# Patient Record
Sex: FEMALE | Race: WHITE | Employment: PART TIME | URBAN - NONMETROPOLITAN AREA
[De-identification: names, ages, dates, MRNs, and addresses within clinical notes are randomized per-mention and may not be internally consistent; named-entity substitution may affect disease eponyms.]

---

## 2020-03-12 ENCOUNTER — OFFICE VISIT (OUTPATIENT)
Dept: PRIMARY CARE CLINIC | Age: 35
End: 2020-03-12
Payer: OTHER GOVERNMENT

## 2020-03-12 VITALS
BODY MASS INDEX: 27 KG/M2 | OXYGEN SATURATION: 98 % | WEIGHT: 172 LBS | SYSTOLIC BLOOD PRESSURE: 122 MMHG | HEIGHT: 67 IN | TEMPERATURE: 98.6 F | HEART RATE: 78 BPM | RESPIRATION RATE: 16 BRPM | DIASTOLIC BLOOD PRESSURE: 78 MMHG

## 2020-03-12 LAB
ANION GAP SERPL CALCULATED.3IONS-SCNC: 13 MMOL/L (ref 7–19)
BUN BLDV-MCNC: 10 MG/DL (ref 6–20)
CALCIUM SERPL-MCNC: 9.2 MG/DL (ref 8.6–10)
CHLORIDE BLD-SCNC: 105 MMOL/L (ref 98–111)
CO2: 23 MMOL/L (ref 22–29)
CREAT SERPL-MCNC: 0.9 MG/DL (ref 0.5–0.9)
GFR NON-AFRICAN AMERICAN: >60
GLUCOSE BLD-MCNC: 96 MG/DL (ref 74–109)
POTASSIUM SERPL-SCNC: 4 MMOL/L (ref 3.5–5)
SEDIMENTATION RATE, ERYTHROCYTE: 7 MM/HR (ref 0–20)
SODIUM BLD-SCNC: 141 MMOL/L (ref 136–145)
TSH SERPL DL<=0.05 MIU/L-ACNC: 2.4 UIU/ML (ref 0.27–4.2)

## 2020-03-12 PROCEDURE — 36415 COLL VENOUS BLD VENIPUNCTURE: CPT | Performed by: NURSE PRACTITIONER

## 2020-03-12 PROCEDURE — 99203 OFFICE O/P NEW LOW 30 MIN: CPT | Performed by: NURSE PRACTITIONER

## 2020-03-12 RX ORDER — RIZATRIPTAN BENZOATE 10 MG/1
10 TABLET ORAL
COMMUNITY
End: 2021-12-08

## 2020-03-12 RX ORDER — SERTRALINE HYDROCHLORIDE 100 MG/1
100 TABLET, FILM COATED ORAL DAILY
COMMUNITY
End: 2020-03-25 | Stop reason: SDUPTHER

## 2020-03-12 RX ORDER — DROSPIRENONE AND ETHINYL ESTRADIOL 0.02-3(28)
1 KIT ORAL DAILY
COMMUNITY
End: 2020-10-14 | Stop reason: SDUPTHER

## 2020-03-12 RX ORDER — BUPROPION HYDROCHLORIDE 150 MG/1
150 TABLET ORAL EVERY MORNING
COMMUNITY
End: 2020-04-21 | Stop reason: SDUPTHER

## 2020-03-12 RX ORDER — CELECOXIB 100 MG/1
100 CAPSULE ORAL 2 TIMES DAILY
Qty: 60 CAPSULE | Refills: 5 | Status: SHIPPED | OUTPATIENT
Start: 2020-03-12 | End: 2020-10-29

## 2020-03-12 SDOH — HEALTH STABILITY: MENTAL HEALTH: HOW OFTEN DO YOU HAVE A DRINK CONTAINING ALCOHOL?: NEVER

## 2020-03-12 NOTE — PROGRESS NOTES
MG tablet Take 100 mg by mouth daily Tale 1.5 tablets DAILY      buPROPion (WELLBUTRIN XL) 150 MG extended release tablet Take 150 mg by mouth every morning      rizatriptan (MAXALT) 10 MG tablet Take 10 mg by mouth once as needed for Migraine May repeat in 2 hours if needed      drospirenone-ethinyl estradiol (DONTRELL) 3-0.02 MG per tablet Take 1 tablet by mouth daily      celecoxib (CELEBREX) 100 MG capsule Take 1 capsule by mouth 2 times daily 60 capsule 5     No current facility-administered medications for this visit. Allergies   Allergen Reactions    Minocycline Swelling       Health Maintenance   Topic Date Due    Varicella vaccine (1 of 2 - 2-dose childhood series) 08/10/1986    HIV screen  08/10/2000    DTaP/Tdap/Td vaccine (1 - Tdap) 08/10/2004    Cervical cancer screen  08/10/2006    Flu vaccine (1) 03/12/2021 (Originally 9/1/2019)    Shingles Vaccine (1 of 2) 08/10/2035    Hepatitis A vaccine  Aged Out    Hepatitis B vaccine  Aged Out    Hib vaccine  Aged Out    Meningococcal (ACWY) vaccine  Aged Out    Pneumococcal 0-64 years Vaccine  Aged Out       Subjective:      Review of Systems   Constitutional: Positive for fatigue and unexpected weight change. Respiratory: Negative. Cardiovascular: Negative. Gastrointestinal: Negative. Genitourinary: Negative. Musculoskeletal: Positive for myalgias. Psychiatric/Behavioral: Negative. Objective:     Physical Exam  Vitals signs and nursing note reviewed. Constitutional:       Appearance: She is well-developed. HENT:      Head: Normocephalic. Right Ear: External ear normal.      Left Ear: External ear normal.   Eyes:      Pupils: Pupils are equal, round, and reactive to light. Neck:      Musculoskeletal: Normal range of motion. Cardiovascular:      Rate and Rhythm: Normal rate and regular rhythm. Heart sounds: Normal heart sounds.    Pulmonary:      Effort: Pulmonary effort is normal.      Breath sounds: Normal breath sounds. Skin:     General: Skin is warm and dry. Neurological:      Mental Status: She is alert and oriented to person, place, and time. Psychiatric:         Behavior: Behavior normal.         Thought Content: Thought content normal.         Judgment: Judgment normal.       /78 (Site: Left Upper Arm, Position: Sitting, Cuff Size: Large Adult)   Pulse 78   Temp 98.6 °F (37 °C) (Temporal)   Resp 16   Ht 5' 7\" (1.702 m)   Wt 172 lb (78 kg)   SpO2 98%   Breastfeeding No   BMI 26.94 kg/m²     Assessment:       Diagnosis Orders   1. Arthralgia of both hands  Cyclic Citrul Peptide Antibody, IgG    QUAN Screen with Reflex    Basic Metabolic Panel    Sedimentation Rate    C-reactive protein    TSH without Reflex   2. Chronic bilateral low back pain without sciatica  Cyclic Citrul Peptide Antibody, IgG    QUAN Screen with Reflex    Basic Metabolic Panel    Sedimentation Rate    C-reactive protein    TSH without Reflex   3. Fatigue, unspecified type  Cyclic Citrul Peptide Antibody, IgG    QUAN Screen with Reflex    Basic Metabolic Panel    Sedimentation Rate    C-reactive protein    TSH without Reflex   4. Weight gain  Cyclic Citrul Peptide Antibody, IgG    QUAN Screen with Reflex    Basic Metabolic Panel    Sedimentation Rate    C-reactive protein    TSH without Reflex         Plan:   More than 50% of the time was spent counseling and coordinating care for a total time of 30min face to face. Patient given educational materials -see patient instructions. Discussed use, benefit, and side effects of prescribed medications. All patient questions answered. Pt voiced understanding. Reviewed health maintenance. Instructed to continue currentmedications, diet and exercise. Patient agreed with treatment plan. Follow up as directed.    MEDICATIONS:  Orders Placed This Encounter   Medications    celecoxib (CELEBREX) 100 MG capsule     Sig: Take 1 capsule by mouth 2 times daily     Dispense:  60 capsule     Refill:  5         ORDERS:  Orders Placed This Encounter   Procedures    Cyclic Citrul Peptide Antibody, IgG    QUAN Screen with Reflex    Basic Metabolic Panel    Sedimentation Rate    C-reactive protein    TSH without Reflex       Follow-up:  Return in about 4 weeks (around 4/9/2020) for f/u. PATIENT INSTRUCTIONS:  Patient Instructions     celebrex is twice a day for arthritis pain  Start stretching or youa  Labs today , if abnormal will do Rheumatology if normal do Dr Stanton Bhakta for fibromyalgia. Continue antidepressants  Continue birth control  Mammogram 35-40  Pap smear when 3 yrs. Next headache instead of maxalt try the ubrelvy one tablet may repeat one in 24 hours. May discuss workup later on headaches  Always have yearly eye exam.  Look at gluten free diet for inflammation. Patient Education        Fibromyalgia: Care Instructions  Your Care Instructions    Fibromyalgia is a painful condition that is not completely understood by medical experts. The cause of fibromyalgia is not known. It can make you feel tired and ache all over. It causes tender spots at specific points of the body that hurt only when you press on them. You may have trouble sleeping, as well as other symptoms. These problems can upset your work and home life. Symptoms tend to come and go, although they may never go away completely. Fibromyalgia does not harm your muscles, joints, or organs. Follow-up care is a key part of your treatment and safety. Be sure to make and go to all appointments, and call your doctor if you are having problems. It's also a good idea to know your test results and keep a list of the medicines you take. How can you care for yourself at home? · Exercise often. Walk, swim, or bike to help with pain and sleep problems and to make you feel better. · Try to get a good night's sleep. Go to bed and get up at the same time each day, whether you feel rested or not.  Make sure you have a good information. Electronically signed by MARTÍNEZ Valdez CNP on 3/16/2020 at 3:45 PM    EMR Dragon/transcription disclaimer:  Much of thisencounter note is electronic transcription/translation of spoken language to printed texts. The electronic translation of spoken language may be erroneous, or at times, nonsensical words or phrases may be inadvertentlytranscribed.   Although I have reviewed the note for such errors, some may still exist.

## 2020-03-12 NOTE — PATIENT INSTRUCTIONS
celebrex is twice a day for arthritis pain  Start stretching or youa  Labs today , if abnormal will do Rheumatology if normal do Dr Zach Bullock for fibromyalgia. Continue antidepressants  Continue birth control  Mammogram 35-40  Pap smear when 3 yrs. Next headache instead of maxalt try the ubrelvy one tablet may repeat one in 24 hours. May discuss workup later on headaches  Always have yearly eye exam.  Look at gluten free diet for inflammation. Patient Education        Fibromyalgia: Care Instructions  Your Care Instructions    Fibromyalgia is a painful condition that is not completely understood by medical experts. The cause of fibromyalgia is not known. It can make you feel tired and ache all over. It causes tender spots at specific points of the body that hurt only when you press on them. You may have trouble sleeping, as well as other symptoms. These problems can upset your work and home life. Symptoms tend to come and go, although they may never go away completely. Fibromyalgia does not harm your muscles, joints, or organs. Follow-up care is a key part of your treatment and safety. Be sure to make and go to all appointments, and call your doctor if you are having problems. It's also a good idea to know your test results and keep a list of the medicines you take. How can you care for yourself at home? · Exercise often. Walk, swim, or bike to help with pain and sleep problems and to make you feel better. · Try to get a good night's sleep. Go to bed and get up at the same time each day, whether you feel rested or not. Make sure you have a good mattress and pillow. · Reduce stress. Avoid things that cause you stress, if you can. If not, work at making them less stressful. Learn to use biofeedback, guided imagery, meditation, or other methods to relax. · Make healthy changes. Eat a balanced diet, quit smoking, and limit alcohol and caffeine.   · Use a heating pad set on low or take warm baths or showers for pain. Using cold packs for up to 20 minutes at a time can also relieve pain. Put a thin cloth between the cold pack and your skin. A gentle massage might help too. · Be safe with medicines. Take your medicines exactly as prescribed. Call your doctor if you think you are having a problem with your medicine. Your doctor may talk to you about taking antidepressant medicines. These medicines may improve sleep, relieve pain, and in some cases treat depression. · Learn about fibromyalgia. This makes coping easier. Then, take an active role in your treatment. · Think about joining a support group with others who have fibromyalgia to learn more and get support. When should you call for help? Watch closely for changes in your health, and be sure to contact your doctor if:    · You feel sad, helpless, or hopeless; lose interest in things you used to enjoy; or have other symptoms of depression.     · Your fibromyalgia symptoms get worse. Where can you learn more? Go to https://Catarizm.[employer]Invention. org and sign in to your [employer] account. Enter V003 in the [employer] box to learn more about \"Fibromyalgia: Care Instructions. \"     If you do not have an account, please click on the \"Sign Up Now\" link. Current as of: March 28, 2019  Content Version: 12.3  © 5267-8433 Healthwise, Incorporated. Care instructions adapted under license by [facility] ([facility]). If you have questions about a medical condition or this instruction, always ask your healthcare professional. [place] [address] any warranty or liability for your use of this information.

## 2020-03-15 LAB
ANA IGG, ELISA: NORMAL
CCP IGG ANTIBODIES: 186 UNITS (ref 0–19)

## 2020-03-16 LAB — C-REACTIVE PROTEIN: 0.26 MG/DL (ref 0–0.5)

## 2020-03-16 ASSESSMENT — ENCOUNTER SYMPTOMS
GASTROINTESTINAL NEGATIVE: 1
RESPIRATORY NEGATIVE: 1

## 2020-03-17 ENCOUNTER — NURSE ONLY (OUTPATIENT)
Dept: PRIMARY CARE CLINIC | Age: 35
End: 2020-03-17

## 2020-03-17 LAB — RHEUMATOID FACTOR: <10 IU/ML

## 2020-03-25 RX ORDER — SERTRALINE HYDROCHLORIDE 100 MG/1
100 TABLET, FILM COATED ORAL DAILY
Qty: 30 TABLET | Refills: 3 | Status: SHIPPED | OUTPATIENT
Start: 2020-03-25 | End: 2020-10-29 | Stop reason: SDUPTHER

## 2020-04-02 ENCOUNTER — TELEMEDICINE (OUTPATIENT)
Dept: PRIMARY CARE CLINIC | Age: 35
End: 2020-04-02
Payer: OTHER GOVERNMENT

## 2020-04-02 ENCOUNTER — TELEPHONE (OUTPATIENT)
Dept: PRIMARY CARE CLINIC | Age: 35
End: 2020-04-02

## 2020-04-02 PROCEDURE — 99214 OFFICE O/P EST MOD 30 MIN: CPT | Performed by: NURSE PRACTITIONER

## 2020-04-02 RX ORDER — TRAMADOL HYDROCHLORIDE 50 MG/1
50 TABLET ORAL 2 TIMES DAILY PRN
Qty: 30 TABLET | Refills: 0 | Status: SHIPPED | OUTPATIENT
Start: 2020-04-02 | End: 2020-04-16

## 2020-04-02 RX ORDER — UBROGEPANT 50 MG/1
TABLET ORAL
Qty: 9 TABLET | Refills: 1 | Status: SHIPPED | OUTPATIENT
Start: 2020-04-02 | End: 2020-10-29 | Stop reason: SDUPTHER

## 2020-04-02 ASSESSMENT — ENCOUNTER SYMPTOMS
BACK PAIN: 0
EYES NEGATIVE: 1
RESPIRATORY NEGATIVE: 1
GASTROINTESTINAL NEGATIVE: 1

## 2020-04-02 NOTE — LETTER
[facility] [address]  [address] [place] [address]  Phone: [phone]  Fax: [phone]    MARTÍNEZ Roberts CNP        April 2, 2020  RE:  [address] [address]      To Whom It May Concern,        Petey Bradford is under my medical care. I began seeing her in March 2020. She underwent testing for some pain she was having. She has been diagnosed with rheumatoid arthritis by lab work. Currently she is scheduled to see a rheumatologist at the [facility]. She will need to undergo several test over the next few months. We have placed her on light duty due to her increasing pain and have started her on multiple new medications. It is my understanding that currently her  is stationed in [facility] and she is a single parent. It would be of benefit to her if her  were allowed to come home to help her in this transitioning time. If you have any questions or concerns, please don't hesitate to call.     Sincerely,        MARTÍNEZ Roberts CNP

## 2020-04-02 NOTE — TELEPHONE ENCOUNTER
Called Dr. Aysha Higginbotham office in [facility] to see if they took  and to see when they could get patient in for a new patient appointment.  The office was closed and will not open back up until Monday after 8:30 AM.

## 2020-04-02 NOTE — PROGRESS NOTES
Allergies   Allergen Reactions    Minocycline Swelling   ,   Past Medical History:   Diagnosis Date    Anxiety     Depression     Headache    , No past surgical history on file.,   Social History     Tobacco Use    Smoking status: Never Smoker    Smokeless tobacco: Never Used   Substance Use Topics    Alcohol use: Never     Frequency: Never    Drug use: Never   ,   Family History   Problem Relation Age of Onset    Arthritis Mother     Heart Disease Father     Diabetes Father     High Blood Pressure Father     High Cholesterol Father     Cancer Maternal Grandmother         uterine   ,   There is no immunization history on file for this patient.,   Health Maintenance   Topic Date Due    Varicella vaccine (1 of 2 - 2-dose childhood series) 08/10/1986    HIV screen  08/10/2000    DTaP/Tdap/Td vaccine (1 - Tdap) 08/10/2004    Cervical cancer screen  08/10/2006    Flu vaccine (Season Ended) 03/12/2021 (Originally 9/1/2020)    Hepatitis A vaccine  Aged Out    Hepatitis B vaccine  Aged Out    Hib vaccine  Aged Out    Meningococcal (ACWY) vaccine  Aged Out    Pneumococcal 0-64 years Vaccine  Aged Out       PHYSICAL EXAMINATION:  Physical Exam  Vitals signs and nursing note reviewed. Constitutional:       Appearance: Normal appearance. She is well-developed. HENT:      Head: Normocephalic. Eyes:      Pupils: Pupils are equal, round, and reactive to light. Neck:      Musculoskeletal: Normal range of motion. Cardiovascular:      Rate and Rhythm: Normal rate. Pulmonary:      Effort: Pulmonary effort is normal.   Skin:     General: Skin is warm and dry. Neurological:      General: No focal deficit present. Mental Status: She is alert and oriented to person, place, and time. Psychiatric:         Mood and Affect: Mood normal.         Behavior: Behavior normal.         Thought Content:  Thought content normal.         Judgment: Judgment normal.         Due to this being a TeleHealth Madina Hwang CNP on 4/2/2020 at 12:02 PM        Pursuant to the emergency declaration under the [address] [place] [address], [facility] waiver authority and the [employer] Act, this Virtual  Visit was conducted, with patient's consent, to reduce the patient's risk of exposure to COVID-19 and provide continuity of care for an established patient. Services were provided through a video synchronous discussion virtually to substitute for in-person clinic visit.

## 2020-04-13 ENCOUNTER — TELEPHONE (OUTPATIENT)
Dept: ADMINISTRATIVE | Age: 35
End: 2020-04-13

## 2020-04-14 ENCOUNTER — PATIENT MESSAGE (OUTPATIENT)
Dept: PRIMARY CARE CLINIC | Age: 35
End: 2020-04-14

## 2020-04-14 NOTE — TELEPHONE ENCOUNTER
From: Arianna Grief  To: JamarMARTÍNEZ Quinteros - CNP  Sent: 4/14/2020 12:21 PM CDT  Subject: Non-Urgent Medical Question    If at all possible I need a few more things added to the letter you wrote for my  to come home from [facility]. They are asking that the following be added. A specific medical diagnosis and prognosis. The date of onset, past and anticipated periods of hospitalization. 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 I know not all of these apply to me, but any that do would be greatly appreciated if it could be put in a letter, or added to the already existing letter. Thank you so much for your time.

## 2020-04-21 ENCOUNTER — PATIENT MESSAGE (OUTPATIENT)
Dept: PRIMARY CARE CLINIC | Age: 35
End: 2020-04-21

## 2020-04-21 RX ORDER — BUPROPION HYDROCHLORIDE 150 MG/1
150 TABLET ORAL EVERY MORNING
Qty: 90 TABLET | Refills: 2 | Status: SHIPPED | OUTPATIENT
Start: 2020-04-21 | End: 2020-11-25 | Stop reason: SDUPTHER

## 2020-05-22 ENCOUNTER — VIRTUAL VISIT (OUTPATIENT)
Dept: PRIMARY CARE CLINIC | Age: 35
End: 2020-05-22
Payer: OTHER GOVERNMENT

## 2020-05-22 PROCEDURE — 99441 PR PHYS/QHP TELEPHONE EVALUATION 5-10 MIN: CPT | Performed by: NURSE PRACTITIONER

## 2020-05-22 RX ORDER — PREDNISONE 10 MG/1
TABLET ORAL
Qty: 18 TABLET | Refills: 0 | Status: SHIPPED | OUTPATIENT
Start: 2020-05-22 | End: 2022-02-28

## 2020-05-22 RX ORDER — CYCLOBENZAPRINE HCL 10 MG
10 TABLET ORAL 3 TIMES DAILY PRN
Qty: 30 TABLET | Refills: 0 | Status: SHIPPED | OUTPATIENT
Start: 2020-05-22 | End: 2020-06-01

## 2020-10-14 RX ORDER — DROSPIRENONE AND ETHINYL ESTRADIOL 0.02-3(28)
1 KIT ORAL DAILY
Qty: 28 TABLET | Refills: 1 | Status: SHIPPED | OUTPATIENT
Start: 2020-10-14 | End: 2020-10-29 | Stop reason: SDUPTHER

## 2020-10-29 ENCOUNTER — OFFICE VISIT (OUTPATIENT)
Dept: PRIMARY CARE CLINIC | Age: 35
End: 2020-10-29
Payer: OTHER GOVERNMENT

## 2020-10-29 VITALS
OXYGEN SATURATION: 99 % | RESPIRATION RATE: 16 BRPM | SYSTOLIC BLOOD PRESSURE: 100 MMHG | BODY MASS INDEX: 26.53 KG/M2 | HEIGHT: 67 IN | DIASTOLIC BLOOD PRESSURE: 70 MMHG | TEMPERATURE: 97.9 F | WEIGHT: 169 LBS | HEART RATE: 85 BPM

## 2020-10-29 PROCEDURE — 99395 PREV VISIT EST AGE 18-39: CPT | Performed by: NURSE PRACTITIONER

## 2020-10-29 RX ORDER — FOLIC ACID 1 MG/1
TABLET ORAL
COMMUNITY
Start: 2020-10-17

## 2020-10-29 RX ORDER — DROSPIRENONE AND ETHINYL ESTRADIOL 0.02-3(28)
1 KIT ORAL DAILY
Qty: 84 TABLET | Refills: 3 | Status: SHIPPED | OUTPATIENT
Start: 2020-10-29 | End: 2021-12-08 | Stop reason: SDUPTHER

## 2020-10-29 RX ORDER — METHOTREXATE 2.5 MG/1
TABLET ORAL
COMMUNITY
Start: 2020-09-10

## 2020-10-29 RX ORDER — ADALIMUMAB 40MG/0.4ML
KIT SUBCUTANEOUS
COMMUNITY
Start: 2020-10-08 | End: 2021-12-08 | Stop reason: SINTOL

## 2020-10-29 RX ORDER — CYCLOBENZAPRINE HCL 10 MG
10 TABLET ORAL DAILY PRN
COMMUNITY

## 2020-10-29 RX ORDER — UBROGEPANT 50 MG/1
TABLET ORAL
Qty: 9 TABLET | Refills: 1 | Status: SHIPPED | OUTPATIENT
Start: 2020-10-29 | End: 2021-12-08 | Stop reason: SDUPTHER

## 2020-10-29 RX ORDER — SERTRALINE HYDROCHLORIDE 100 MG/1
100 TABLET, FILM COATED ORAL DAILY
Qty: 145 TABLET | Refills: 3 | Status: SHIPPED | OUTPATIENT
Start: 2020-10-29 | End: 2021-06-25 | Stop reason: SDUPTHER

## 2020-10-29 ASSESSMENT — PATIENT HEALTH QUESTIONNAIRE - PHQ9
1. LITTLE INTEREST OR PLEASURE IN DOING THINGS: 1
SUM OF ALL RESPONSES TO PHQ QUESTIONS 1-9: 1
SUM OF ALL RESPONSES TO PHQ9 QUESTIONS 1 & 2: 1
SUM OF ALL RESPONSES TO PHQ QUESTIONS 1-9: 1
SUM OF ALL RESPONSES TO PHQ QUESTIONS 1-9: 1
2. FEELING DOWN, DEPRESSED OR HOPELESS: 0

## 2020-10-29 ASSESSMENT — ENCOUNTER SYMPTOMS
DIARRHEA: 1
BACK PAIN: 1

## 2020-10-29 NOTE — PATIENT INSTRUCTIONS
Continue with Dr. Terry Rosen continue taking the birth control continuously  If you would like a referral for an endometrial ablation and a tubal ligation I would be glad to do that for you  Baseline mammogram  Patient Education        Endometrial Ablation: Before Your Procedure  What is endometrial ablation? Endometrial ablation is a type of procedure that's often used to treat heavy menstrual bleeding. It can also be used for other types of bleeding in the uterus. It's not recommended if you plan to get pregnant. Ablation works by destroying the lining of your uterus. As it heals, the lining will scar. This scarring reduces or prevents bleeding. Your doctor may give you medicine to help you relax. You may also get medicine to help with pain. First, your doctor inserts a special tool into your vagina. This is called a speculum. It gently spreads apart the sides of your vagina. Next, the doctor may put a lighted tube through your cervix. This is called a hysteroscope or scope. It helps the doctor see inside your uterus. Then the doctor inserts a device to destroy the lining. This device may work in one of many ways. It may use a laser beam, heat, electricity, freezing, or microwaves. Ablation can be done in a doctor's office. Or it may be done in a hospital. It usually takes less than an hour. You can go home after the procedure. Follow-up care is a key part of your treatment and safety. Be sure to make and go to all appointments, and call your doctor if you are having problems. It's also a good idea to know your test results and keep a list of the medicines you take. How do you prepare for the procedure? Procedures can be stressful. This information will help you understand what you can expect. And it will help you safely prepare for your procedure. Preparing for the procedure    · Be sure you have someone to take you home.  Anesthesia and pain medicine will make it unsafe for you to drive or get home on your own.     · Understand exactly what procedure is planned, along with the risks, benefits, and other options.     · If you take aspirin or some other blood thinner, ask your doctor if you should stop taking it before your procedure. Make sure that you understand exactly what your doctor wants you to do. These medicines increase the risk of bleeding.     · Tell your doctor ALL the medicines, vitamins, supplements, and herbal remedies you take. Some may increase the risk of problems during your procedure. Your doctor will tell you if you should stop taking any of them before the procedure and how soon to do it.     · Make sure your doctor and the hospital have a copy of your advance directive. If you don't have one, you may want to prepare one. It lets others know your health care wishes. It's a good thing to have before any type of surgery or procedure. What happens on the day of the procedure? · Follow the instructions exactly about when to stop eating and drinking. If you don't, your procedure may be canceled. If your doctor told you to take your medicines on the day of the procedure, take them with only a sip of water.     · Take a bath or shower before you come in for your procedure. Do not apply lotions, perfumes, deodorants, or nail polish.     · Take off all jewelry and piercings. And take out contact lenses, if you wear them. At the doctor's office or hospital   · Bring a picture ID.     · You will be kept comfortable and safe by your anesthesia provider. You may get medicine that relaxes you or puts you in a light sleep.     · The procedure will take less than an hour. When should you call your doctor? · You have questions or concerns.     · You do not understand how to prepare for your procedure.     · You become ill before the procedure (such as fever, flu, or a cold).     · You need to reschedule or have changed your mind about having the procedure. Where can you learn more?   Go to https://chpepiceweb.[email or web address] org and sign in to your [employer] account. Enter Y830 in the rimidihire box to learn more about \"Endometrial Ablation: Before Your Procedure. \"     If you do not have an account, please click on the \"Sign Up Now\" link. Current as of: November 8, 2019               Content Version: 12.6  © 6713-6677 [employer], [employer]. Care instructions adapted under license by [facility] ([facility]). If you have questions about a medical condition or this instruction, always ask your healthcare professional. [address] any warranty or liability for your use of this information.

## 2020-10-29 NOTE — PROGRESS NOTES
Tobacco Use    Smoking status: Never Smoker    Smokeless tobacco: Never Used   Substance Use Topics    Alcohol use: Never     Frequency: Never      Current Outpatient Medications   Medication Sig Dispense Refill    HUMIRA PEN 40 MG/0.4ML PNKT       cyclobenzaprine (FLEXERIL) 10 MG tablet Take 10 mg by mouth daily as needed      folic acid (FOLVITE) 1 MG tablet       methotrexate (RHEUMATREX) 2.5 MG chemo tablet       drospirenone-ethinyl estradiol (DONTRELL) 3-0.02 MG per tablet Take 1 tablet by mouth daily Take the pill continue this skipping the placebo pills 84 tablet 3    sertraline (ZOLOFT) 100 MG tablet Take 1 tablet by mouth daily Tale 1.5 tablets DAILY 145 tablet 3    Ubrogepant (UBRELVY) 50 MG TABS 1 p.o. at the onset of a headache and may repeat one in a 24-hour. 9 tablet 1    predniSONE (DELTASONE) 10 MG tablet Days 1-3 take 1 PO TID, days 4-6 take 1 PO BID, days 7-9 take 1 PO daily. 18 tablet 0    buPROPion (WELLBUTRIN XL) 150 MG extended release tablet Take 1 tablet by mouth every morning 90 tablet 2    rizatriptan (MAXALT) 10 MG tablet Take 10 mg by mouth once as needed for Migraine May repeat in 2 hours if needed       No current facility-administered medications for this visit. Allergies   Allergen Reactions    Minocycline Swelling       Health Maintenance   Topic Date Due    Varicella vaccine (1 of 2 - 2-dose childhood series) 08/10/1986    HIV screen  08/10/2000    DTaP/Tdap/Td vaccine (1 - Tdap) 08/10/2004    Cervical cancer screen  08/10/2006    Flu vaccine (1) 09/01/2020    Hepatitis A vaccine  Aged Out    Hepatitis B vaccine  Aged Out    Hib vaccine  Aged Out    Meningococcal (ACWY) vaccine  Aged Out    Pneumococcal 0-64 years Vaccine  Aged Out       Subjective:      Review of Systems   Constitutional: Negative. HENT: Negative. Gastrointestinal: Positive for diarrhea (with meds). Genitourinary: Positive for menstrual problem.    Musculoskeletal: Positive for arthralgias and back pain. Psychiatric/Behavioral: Positive for dysphoric mood and sleep disturbance. Negative for self-injury and suicidal ideas. The patient is nervous/anxious. Objective:     Physical Exam  Vitals signs and nursing note reviewed. Exam conducted with a chaperone present. Constitutional:       Appearance: She is well-developed. HENT:      Head: Normocephalic. Right Ear: Tympanic membrane and external ear normal.      Left Ear: Tympanic membrane and external ear normal.      Nose: Nose normal.   Eyes:      Pupils: Pupils are equal, round, and reactive to light. Neck:      Musculoskeletal: Normal range of motion. Cardiovascular:      Rate and Rhythm: Normal rate and regular rhythm. Heart sounds: Normal heart sounds. Pulmonary:      Effort: Pulmonary effort is normal.      Breath sounds: Normal breath sounds. Chest:      Breasts:         Right: Normal.         Left: Normal.   Abdominal:      General: Abdomen is flat. Bowel sounds are normal.   Genitourinary:     General: Normal vulva. Exam position: Supine. Labia:         Right: No rash, tenderness, lesion or injury. Left: No rash, tenderness, lesion or injury. Vagina: Normal.      Cervix: Friability present. Uterus: Normal.       Adnexa: Right adnexa normal and left adnexa normal.      Rectum: Normal.          Comments: Pap smear obtained from the cervix  Musculoskeletal: Normal range of motion. Skin:     General: Skin is warm and dry. Capillary Refill: Capillary refill takes less than 2 seconds. Neurological:      General: No focal deficit present. Mental Status: She is alert and oriented to person, place, and time. Psychiatric:         Mood and Affect: Mood normal.         Behavior: Behavior normal.         Thought Content:  Thought content normal.         Judgment: Judgment normal.       /70   Pulse 85   Temp 97.9 °F (36.6 °C) (Temporal)   Resp 16   Ht 5' 7\" (1.702 m)   Wt 169 lb (76.7 kg)   SpO2 99%   Breastfeeding No   BMI 26.47 kg/m²     Assessment:       Diagnosis Orders   1. Well woman exam with routine gynecological exam     2. Screening for malignant neoplasm of cervix  PAP SMEAR   3. Rheumatoid arthritis with negative rheumatoid factor, involving unspecified site ([facility] [address]     4. Encounter for screening mammogram for malignant neoplasm of breast  Mammography screening bilateral   5. Depression with anxiety           Plan:       Patient given educational materials -see patient instructions. Discussed use, benefit, and side effects of prescribed medications. All patient questions answered. Pt voiced understanding. I discussed with the patient with the simpler for her  to have a vasectomy but she could get her tubes tied when she was having an endometrial ablation if she wants. recommemded Dr Yonis Murrell or Dr Ros Hairston if she wants tubes tied   Reviewed health maintenance. Instructed to continue currentmedications, diet and exercise. Patient agreed with treatment plan. Follow up as directed. MEDICATIONS:  Orders Placed This Encounter   Medications    drospirenone-ethinyl estradiol (DONTRELL) 3-0.02 MG per tablet     Sig: Take 1 tablet by mouth daily Take the pill continue this skipping the placebo pills     Dispense:  84 tablet     Refill:  3    sertraline (ZOLOFT) 100 MG tablet     Sig: Take 1 tablet by mouth daily Tale 1.5 tablets DAILY     Dispense:  145 tablet     Refill:  3    Ubrogepant (UBRELVY) 50 MG TABS     Si p.o. at the onset of a headache and may repeat one in a 24-hour. Dispense:  9 tablet     Refill:  1         ORDERS:  Orders Placed This Encounter   Procedures    Mammography screening bilateral    PAP SMEAR       Follow-up:  No follow-ups on file.     PATIENT INSTRUCTIONS:  Patient Instructions     Continue with Dr. Candy Matute continue taking the birth control continuously  If you would like a referral for an endometrial ablation and a tubal ligation I would be glad to do that for you  Baseline mammogram  Patient Education        Endometrial Ablation: Before Your Procedure  What is endometrial ablation? Endometrial ablation is a type of procedure that's often used to treat heavy menstrual bleeding. It can also be used for other types of bleeding in the uterus. It's not recommended if you plan to get pregnant. Ablation works by destroying the lining of your uterus. As it heals, the lining will scar. This scarring reduces or prevents bleeding. Your doctor may give you medicine to help you relax. You may also get medicine to help with pain. First, your doctor inserts a special tool into your vagina. This is called a speculum. It gently spreads apart the sides of your vagina. Next, the doctor may put a lighted tube through your cervix. This is called a hysteroscope or scope. It helps the doctor see inside your uterus. Then the doctor inserts a device to destroy the lining. This device may work in one of many ways. It may use a laser beam, heat, electricity, freezing, or microwaves. Ablation can be done in a doctor's office. Or it may be done in a hospital. It usually takes less than an hour. You can go home after the procedure. Follow-up care is a key part of your treatment and safety. Be sure to make and go to all appointments, and call your doctor if you are having problems. It's also a good idea to know your test results and keep a list of the medicines you take. How do you prepare for the procedure? Procedures can be stressful. This information will help you understand what you can expect. And it will help you safely prepare for your procedure. Preparing for the procedure    · Be sure you have someone to take you home.  Anesthesia and pain medicine will make it unsafe for you to drive or get home on your own.     · Understand exactly what procedure is planned, along with the risks, benefits, and other options.     · If you take aspirin or some other blood thinner, ask your doctor if you should stop taking it before your procedure. Make sure that you understand exactly what your doctor wants you to do. These medicines increase the risk of bleeding.     · Tell your doctor ALL the medicines, vitamins, supplements, and herbal remedies you take. Some may increase the risk of problems during your procedure. Your doctor will tell you if you should stop taking any of them before the procedure and how soon to do it.     · Make sure your doctor and the hospital have a copy of your advance directive. If you don't have one, you may want to prepare one. It lets others know your health care wishes. It's a good thing to have before any type of surgery or procedure. What happens on the day of the procedure? · Follow the instructions exactly about when to stop eating and drinking. If you don't, your procedure may be canceled. If your doctor told you to take your medicines on the day of the procedure, take them with only a sip of water.     · Take a bath or shower before you come in for your procedure. Do not apply lotions, perfumes, deodorants, or nail polish.     · Take off all jewelry and piercings. And take out contact lenses, if you wear them. At the doctor's office or hospital   · Bring a picture ID.     · You will be kept comfortable and safe by your anesthesia provider. You may get medicine that relaxes you or puts you in a light sleep.     · The procedure will take less than an hour. When should you call your doctor? · You have questions or concerns.     · You do not understand how to prepare for your procedure.     · You become ill before the procedure (such as fever, flu, or a cold).     · You need to reschedule or have changed your mind about having the procedure. Where can you learn more? Go to [email or web address] org and sign in to your [employer] account.  Enter E022 in the [employer]ook box to learn more about \"Endometrial Ablation: Before Your Procedure. \"     If you do not have an account, please click on the \"Sign Up Now\" link. Current as of: November 8, 2019               Content Version: 12.6  © 9367-9507 [employer]. Care instructions adapted under license by [facility] ([facility]). If you have questions about a medical condition or this instruction, always ask your healthcare professional. [place] [address] any warranty or liability for your use of this information. Electronically signed by MARTÍNEZ Lee CNP on 10/29/2020 at 1:55 PM    EMR Dragon/transcription disclaimer:  Much of thisencounter note is electronic transcription/translation of spoken language to printed texts. The electronic translation of spoken language may be erroneous, or at times, nonsensical words or phrases may be inadvertentlytranscribed.   Although I have reviewed the note for such errors, some may still exist.

## 2020-11-19 ENCOUNTER — HOSPITAL ENCOUNTER (OUTPATIENT)
Dept: WOMENS IMAGING | Age: 35
Discharge: HOME | End: 2020-11-19
Payer: OTHER GOVERNMENT

## 2020-11-19 PROCEDURE — 77063 BREAST TOMOSYNTHESIS BI: CPT

## 2020-11-25 RX ORDER — BUPROPION HYDROCHLORIDE 150 MG/1
150 TABLET ORAL EVERY MORNING
Qty: 90 TABLET | Refills: 2 | Status: SHIPPED | OUTPATIENT
Start: 2020-11-25 | End: 2021-10-18

## 2020-12-02 PROBLEM — M15.0 PRIMARY GENERALIZED HYPERTROPHIC OSTEOARTHROSIS: Status: ACTIVE | Noted: 2020-07-09

## 2021-10-18 RX ORDER — BUPROPION HYDROCHLORIDE 150 MG/1
TABLET ORAL
Qty: 90 TABLET | Refills: 3 | Status: SHIPPED | OUTPATIENT
Start: 2021-10-18 | End: 2021-12-08 | Stop reason: SDUPTHER

## 2021-12-08 ENCOUNTER — OFFICE VISIT (OUTPATIENT)
Dept: PRIMARY CARE CLINIC | Age: 36
End: 2021-12-08
Payer: OTHER GOVERNMENT

## 2021-12-08 VITALS
RESPIRATION RATE: 18 BRPM | DIASTOLIC BLOOD PRESSURE: 66 MMHG | TEMPERATURE: 99.3 F | BODY MASS INDEX: 28.44 KG/M2 | SYSTOLIC BLOOD PRESSURE: 104 MMHG | OXYGEN SATURATION: 97 % | HEART RATE: 85 BPM | WEIGHT: 181.2 LBS | HEIGHT: 67 IN

## 2021-12-08 DIAGNOSIS — Z00.00 WELL ADULT HEALTH CHECK: Primary | ICD-10-CM

## 2021-12-08 DIAGNOSIS — M06.4 INFLAMMATORY POLYARTHROPATHY (HCC): ICD-10-CM

## 2021-12-08 DIAGNOSIS — M79.10 MYALGIA: ICD-10-CM

## 2021-12-08 DIAGNOSIS — Z30.09 FAMILY PLANNING ADVICE: ICD-10-CM

## 2021-12-08 DIAGNOSIS — R53.83 FATIGUE, UNSPECIFIED TYPE: ICD-10-CM

## 2021-12-08 DIAGNOSIS — G89.29 CHRONIC PAIN OF LEFT WRIST: ICD-10-CM

## 2021-12-08 DIAGNOSIS — M25.532 CHRONIC PAIN OF LEFT WRIST: ICD-10-CM

## 2021-12-08 DIAGNOSIS — F41.8 DEPRESSION WITH ANXIETY: ICD-10-CM

## 2021-12-08 LAB
ALBUMIN SERPL-MCNC: 4.3 G/DL (ref 3.5–5.2)
ALP BLD-CCNC: 82 U/L (ref 35–104)
ALT SERPL-CCNC: 14 U/L (ref 5–33)
ANION GAP SERPL CALCULATED.3IONS-SCNC: 12 MMOL/L (ref 7–19)
AST SERPL-CCNC: 11 U/L (ref 5–32)
BASOPHILS ABSOLUTE: 0.1 K/UL (ref 0–0.2)
BASOPHILS RELATIVE PERCENT: 1 % (ref 0–1)
BILIRUB SERPL-MCNC: 0.3 MG/DL (ref 0.2–1.2)
BUN BLDV-MCNC: 10 MG/DL (ref 6–20)
CALCIUM SERPL-MCNC: 9.6 MG/DL (ref 8.6–10)
CHLORIDE BLD-SCNC: 104 MMOL/L (ref 98–111)
CO2: 22 MMOL/L (ref 22–29)
CREAT SERPL-MCNC: 1 MG/DL (ref 0.5–0.9)
EOSINOPHILS ABSOLUTE: 0.1 K/UL (ref 0–0.6)
EOSINOPHILS RELATIVE PERCENT: 1.5 % (ref 0–5)
GFR AFRICAN AMERICAN: >59
GFR NON-AFRICAN AMERICAN: >60
GLUCOSE BLD-MCNC: 84 MG/DL (ref 74–109)
HCT VFR BLD CALC: 44.5 % (ref 37–47)
HEMOGLOBIN: 14.2 G/DL (ref 12–16)
IMMATURE GRANULOCYTES #: 0 K/UL
IRON SATURATION: 21 % (ref 14–50)
IRON: 77 UG/DL (ref 37–145)
LYMPHOCYTES ABSOLUTE: 2.3 K/UL (ref 1.1–4.5)
LYMPHOCYTES RELATIVE PERCENT: 38.1 % (ref 20–40)
MCH RBC QN AUTO: 29.8 PG (ref 27–31)
MCHC RBC AUTO-ENTMCNC: 31.9 G/DL (ref 33–37)
MCV RBC AUTO: 93.3 FL (ref 81–99)
MONOCYTES ABSOLUTE: 0.4 K/UL (ref 0–0.9)
MONOCYTES RELATIVE PERCENT: 7.2 % (ref 0–10)
NEUTROPHILS ABSOLUTE: 3.2 K/UL (ref 1.5–7.5)
NEUTROPHILS RELATIVE PERCENT: 51.9 % (ref 50–65)
PDW BLD-RTO: 13.2 % (ref 11.5–14.5)
PLATELET # BLD: 258 K/UL (ref 130–400)
PMV BLD AUTO: 9.5 FL (ref 9.4–12.3)
POTASSIUM SERPL-SCNC: 4.3 MMOL/L (ref 3.5–5)
RBC # BLD: 4.77 M/UL (ref 4.2–5.4)
SEDIMENTATION RATE, ERYTHROCYTE: 5 MM/HR (ref 0–20)
SODIUM BLD-SCNC: 138 MMOL/L (ref 136–145)
TOTAL CK: 58 U/L (ref 26–192)
TOTAL IRON BINDING CAPACITY: 366 UG/DL (ref 250–400)
TOTAL PROTEIN: 7.3 G/DL (ref 6.6–8.7)
VITAMIN B-12: 357 PG/ML (ref 211–946)
WBC # BLD: 6.1 K/UL (ref 4.8–10.8)

## 2021-12-08 PROCEDURE — 99395 PREV VISIT EST AGE 18-39: CPT | Performed by: NURSE PRACTITIONER

## 2021-12-08 RX ORDER — ETANERCEPT 50 MG/ML
SOLUTION SUBCUTANEOUS
COMMUNITY
Start: 2021-10-14

## 2021-12-08 RX ORDER — UBROGEPANT 50 MG/1
TABLET ORAL
Qty: 27 TABLET | Refills: 1 | Status: SHIPPED | OUTPATIENT
Start: 2021-12-08

## 2021-12-08 RX ORDER — SERTRALINE HYDROCHLORIDE 100 MG/1
100 TABLET, FILM COATED ORAL DAILY
Qty: 145 TABLET | Refills: 3 | Status: SHIPPED | OUTPATIENT
Start: 2021-12-08 | End: 2021-12-13 | Stop reason: SDUPTHER

## 2021-12-08 RX ORDER — DICLOFENAC SODIUM 75 MG/1
TABLET, DELAYED RELEASE ORAL
COMMUNITY
Start: 2021-10-14

## 2021-12-08 RX ORDER — BUPROPION HYDROCHLORIDE 150 MG/1
TABLET ORAL
Qty: 90 TABLET | Refills: 3 | Status: SHIPPED | OUTPATIENT
Start: 2021-12-08

## 2021-12-08 RX ORDER — DROSPIRENONE AND ETHINYL ESTRADIOL 0.02-3(28)
1 KIT ORAL DAILY
Qty: 84 TABLET | Refills: 3 | Status: SHIPPED | OUTPATIENT
Start: 2021-12-08

## 2021-12-08 ASSESSMENT — ENCOUNTER SYMPTOMS
RESPIRATORY NEGATIVE: 1
GASTROINTESTINAL NEGATIVE: 1

## 2021-12-08 ASSESSMENT — PATIENT HEALTH QUESTIONNAIRE - PHQ9
SUM OF ALL RESPONSES TO PHQ QUESTIONS 1-9: 0
1. LITTLE INTEREST OR PLEASURE IN DOING THINGS: 0
2. FEELING DOWN, DEPRESSED OR HOPELESS: 0
SUM OF ALL RESPONSES TO PHQ9 QUESTIONS 1 & 2: 0

## 2021-12-08 NOTE — PROGRESS NOTES
[facility] PHYSICIAN SERVICES  [facility]  [address] [place] [address]  [address] [place] [address]  Dept: [phone]  Dept Fax: [phone]  Loc: [phone]    Maverick Navarro is a 39 y.o. female who presents today for her medical conditions/complaints as noted below. Maverick Navarro is c/o of Annual Exam (Pt is here for a physical. Pt is not fasting.), Wrist Pain (Pt cites pain in her wrists but the left is worse. Pt states her levels of inflammation are normal so her specialist is stating that it isn't due to her RA> PT states she still is having the fatigue and all the other same symptoms. ), and Medication Refill (Pt is needing medication refilled. )        HPI:     HPI   Chief Complaint   Patient presents with    Annual Exam     Pt is here for a physical. Pt is not fasting.  Wrist Pain     Pt cites pain in her wrists but the left is worse. Pt states her levels of inflammation are normal so her specialist is stating that it isn't due to her RA> PT states she still is having the fatigue and all the other same symptoms.  Medication Refill     Pt is needing medication refilled. She had a Pap smear last year this time it was normal.  She does not want to have anymore children but her  will have a vasectomy. She would like a referral to discuss tubal ligation. Dr Tristin Dial did xray the wrist and says it is not related to inflammation. She feels her energy level is very low, fatigue. Labs have been done by specialist but no results in chart. Muscle aches. She takes the Jerald for headaches. Right sided weakness at times. The headaches are relieved with [place] and [place] but they seem more than used to be. She does not think there are any side effects to her current medication she is taking for the RA but she just does not feel like something is right. Past Medical History:   Diagnosis Date    Anxiety     Depression     Headache     Rheumatoid arthritis ([facility] [address]       History reviewed.  No pertinent surgical history. Vitals 12/8/2021 10/29/2020 8/56/0660   SYSTOLIC 611 322 757   DIASTOLIC 66 70 78   Site Left Upper Arm - Left Upper Arm   Position Sitting - Sitting   Cuff Size Large Adult - Large Adult   Pulse 85 85 78   Temp 99.3 97.9 98.6   Resp 18 16 16   SpO2 97 99 98   Weight 181 lb 3.2 oz 169 lb 172 lb   Height 5' 7\" 5' 7\" 5' 7\"   Body mass index 28.38 kg/m2 26.47 kg/m2 26.94 kg/m2   Some recent data might be hidden       Family History   Problem Relation Age of Onset    Arthritis Mother     Heart Disease Father     Diabetes Father     High Blood Pressure Father     High Cholesterol Father     Cancer Maternal Grandmother         uterine    Breast Cancer Maternal Aunt        Social History     Tobacco Use    Smoking status: Never Smoker    Smokeless tobacco: Never Used   Substance Use Topics    Alcohol use: Never      Current Outpatient Medications on File Prior to Visit   Medication Sig Dispense Refill    ENBREL SURECLICK 50 MG/ML SOAJ       diclofenac (VOLTAREN) 75 MG EC tablet       cyclobenzaprine (FLEXERIL) 10 MG tablet Take 10 mg by mouth daily as needed      folic acid (FOLVITE) 1 MG tablet       methotrexate (RHEUMATREX) 2.5 MG chemo tablet       predniSONE (DELTASONE) 10 MG tablet Days 1-3 take 1 PO TID, days 4-6 take 1 PO BID, days 7-9 take 1 PO daily. 18 tablet 0     No current facility-administered medications on file prior to visit.      Allergies   Allergen Reactions    Minocycline Swelling    Humira [Adalimumab] Rash       Health Maintenance   Topic Date Due    Hepatitis C screen  Never done    HIV screen  Never done    Varicella vaccine (1 of 2 - 2-dose childhood series) 12/29/2021 (Originally 8/10/1986)    DTaP/Tdap/Td vaccine (1 - Tdap) 12/08/2022 (Originally 8/10/2004)    Flu vaccine (1) 12/08/2022 (Originally 9/1/2021)    COVID-19 Vaccine (1) 12/17/2025 (Originally 8/10/1997)    Cervical cancer screen  10/30/2023    Hepatitis A vaccine  Aged Out    Hepatitis B vaccine  Aged Out    Hib vaccine  Aged Out    Meningococcal (ACWY) vaccine  Aged Out    Pneumococcal 0-64 years Vaccine  Aged Out       Subjective:      Review of Systems   Constitutional: Positive for activity change and fatigue. HENT: Negative. Respiratory: Negative. Gastrointestinal: Negative. Genitourinary: Negative. Musculoskeletal: Positive for arthralgias and myalgias. Wrist pain   Neurological: Positive for headaches. Hematological: Negative. Psychiatric/Behavioral: Negative. Objective:     Physical Exam  Vitals and nursing note reviewed. Constitutional:       Appearance: Normal appearance. She is well-developed. HENT:      Head: Normocephalic. Right Ear: External ear normal.      Left Ear: External ear normal.      Nose: Nose normal.   Eyes:      Pupils: Pupils are equal, round, and reactive to light. Cardiovascular:      Rate and Rhythm: Normal rate and regular rhythm. Heart sounds: Normal heart sounds. Pulmonary:      Effort: Pulmonary effort is normal.      Breath sounds: Normal breath sounds. Abdominal:      General: Bowel sounds are normal.   Genitourinary:     Comments: Declined gu and breast exam  Musculoskeletal:         General: Normal range of motion. Right wrist: Normal.      Left wrist: Tenderness present. No swelling, deformity, effusion, lacerations, bony tenderness, snuff box tenderness or crepitus. Normal range of motion. Normal pulse. Cervical back: Normal range of motion. Skin:     General: Skin is warm and dry. Capillary Refill: Capillary refill takes less than 2 seconds. Neurological:      General: No focal deficit present. Mental Status: She is alert and oriented to person, place, and time. Psychiatric:         Mood and Affect: Mood normal.         Behavior: Behavior normal.         Thought Content:  Thought content normal.         Judgment: Judgment normal.       /66 (Site: Left Upper Arm, Position: Sitting, Cuff Size: Large Adult)   Pulse 85   Temp 99.3 °F (37.4 °C) (Temporal)   Resp 18   Ht 5' 7\" (1.702 m)   Wt 181 lb 3.2 oz (82.2 kg)   LMP  (LMP Unknown)   SpO2 97%   BMI 28.38 kg/m²     Assessment:       Diagnosis Orders   1. Well adult health check     2. Inflammatory polyarthropathy (HCC)  CBC Auto Differential    Sedimentation Rate    Vitamin B12    Iron and TIBC    CK    Comprehensive Metabolic Panel   3. Depression with anxiety     4. Fatigue, unspecified type  CBC Auto Differential    Sedimentation Rate    Vitamin B12    Iron and TIBC    CK    Comprehensive Metabolic Panel   5. Myalgia  CBC Auto Differential    Sedimentation Rate    Vitamin B12    Iron and TIBC    CK    Comprehensive Metabolic Panel   6. Family planning advice  External Referral To Ob-Gyn   7. Chronic pain of left wrist  Katalina Dimas MD, Orthopaedic Surgery, [place]         Plan: This was her wellness visit but she had multiple complaints including her wrist, the fatigue in her other symptoms. I am going to check some labs today if they are abnormal we can treat that if they are normal we may try to get an MRI looking for MS. PDMP Monitoring:    Last PDMP [facility] SYSTEM as Reviewed [facility]):  Review User Review Instant Review Result          Last Controlled Substance Monitoring Documentation      Telemedicine from 4/2/2020 in [facility] [address] The Prescription Monitoring Report for this patient was reviewed today. filed at 04/02/2020 1158   Periodic Controlled Substance Monitoring Possible medication side effects, risk of tolerance/dependence & alternative treatments discussed.,  No signs of potential drug abuse or diversion identified. filed at 04/02/2020 1158        Urine Drug Screenings (1 yr)    No resulted procedures found. Medication Contract and Consent for Opioid Use Documents Filed      No documents found                 Patient given educational materials -see patient instructions. Discussed use, benefit, and side effects of prescribed medications. All patient questions answered. Pt voiced understanding. Reviewed health maintenance. Instructed to continue currentmedications, diet and exercise. Patient agreed with treatment plan. Follow up as directed. MEDICATIONS:  Orders Placed This Encounter   Medications    buPROPion (WELLBUTRIN XL) 150 MG extended release tablet     Sig: TAKE 1 TABLET EVERY MORNING     Dispense:  90 tablet     Refill:  3    drospirenone-ethinyl estradiol (DONTRELL) 3-0.02 MG per tablet     Sig: Take 1 tablet by mouth daily Take the pill continue this skipping the placebo pills     Dispense:  84 tablet     Refill:  3    sertraline (ZOLOFT) 100 MG tablet     Sig: Take 1 tablet by mouth daily Tale 1.5 tablets DAILY     Dispense:  145 tablet     Refill:  3    Ubrogepant (UBRELVY) 50 MG TABS     Si p.o. at the onset of a headache and may repeat one in a 24-hour. Dispense:  27 tablet     Refill:  1         ORDERS:  Orders Placed This Encounter   Procedures    CBC Auto Differential    Sedimentation Rate    Vitamin B12    Iron and TIBC    CK    Comprehensive Metabolic Panel    External Referral To Ob-Gyn   Goyo Radford MD, Orthopaedic Surgery, [place]       Follow-up:  No follow-ups on file. PATIENT INSTRUCTIONS:  Patient Instructions   Continue birth control until you see gyn about tubal  Continue with Dr Nghia Vásquez current meds  Labs today looking for fatigue, joint and muscle issues other than RA. May need to do MRI   To r/o Ms, may need neurology consult. Electronically signed by MARTÍNEZ Guevara CNP on 2021 at 5:20 PM    EMR Dragon/transcription disclaimer:  Much of thisencounter note is electronic transcription/translation of spoken language to printed texts. The electronic translation of spoken language may be erroneous, or at times, nonsensical words or phrases may be inadvertentlytranscribed.   Although I have reviewed the note for such errors, some may still exist.

## 2021-12-08 NOTE — PATIENT INSTRUCTIONS
Continue birth control until you see gyn about tubal  Continue with Dr Sally Aguilera current meds  Labs today looking for fatigue, joint and muscle issues other than RA. May need to do MRI   To r/o Ms, may need neurology consult.

## 2021-12-13 RX ORDER — SERTRALINE HYDROCHLORIDE 100 MG/1
100 TABLET, FILM COATED ORAL DAILY
Qty: 145 TABLET | Refills: 3 | Status: SHIPPED | OUTPATIENT
Start: 2021-12-13

## 2021-12-21 DIAGNOSIS — M06.4 INFLAMMATORY POLYARTHROPATHY (HCC): Primary | ICD-10-CM

## 2021-12-21 DIAGNOSIS — R51.9 NONINTRACTABLE HEADACHE, UNSPECIFIED CHRONICITY PATTERN, UNSPECIFIED HEADACHE TYPE: ICD-10-CM

## 2021-12-27 RX ORDER — DROSPIRENONE AND ETHINYL ESTRADIOL 0.03MG-3MG
KIT ORAL
Qty: 84 TABLET | Refills: 1 | Status: SHIPPED | OUTPATIENT
Start: 2021-12-27 | End: 2021-12-27 | Stop reason: SDUPTHER

## 2021-12-27 RX ORDER — DROSPIRENONE AND ETHINYL ESTRADIOL 0.03MG-3MG
1 KIT ORAL DAILY
Qty: 84 TABLET | Refills: 3 | Status: SHIPPED | OUTPATIENT
Start: 2021-12-27 | End: 2022-02-28

## 2021-12-28 ENCOUNTER — HOSPITAL ENCOUNTER (OUTPATIENT)
Dept: NEUROLOGY | Age: 36
Discharge: HOME | End: 2021-12-28
Payer: OTHER GOVERNMENT

## 2021-12-28 DIAGNOSIS — R20.2 PARESTHESIA OF LEFT UPPER LIMB: ICD-10-CM

## 2021-12-28 PROCEDURE — 95886 MUSC TEST DONE W/N TEST COMP: CPT | Performed by: PSYCHIATRY & NEUROLOGY

## 2021-12-28 PROCEDURE — 95909 NRV CNDJ TST 5-6 STUDIES: CPT

## 2021-12-28 PROCEDURE — 95909 NRV CNDJ TST 5-6 STUDIES: CPT | Performed by: PSYCHIATRY & NEUROLOGY

## 2021-12-28 PROCEDURE — 95886 MUSC TEST DONE W/N TEST COMP: CPT

## 2021-12-28 NOTE — PROCEDURES
[facility] [address] [facility]                             ELECTROMYOGRAM REPORT    PATIENT NAME: Orlando Avery                    :        1985  MED REC NO:   [id number]                              ROOM:  ACCOUNT NO:   [de-identified]                           ADMIT DATE: 2021  PROVIDER:     Sonia Fonseca MD    DATE OF EM2021    EMG/NERVE STUDY LEFT UPPER EXTREMITY    REASON FOR TEST:  Left arm pain and numbness. SUMMARY:  Nerve conduction studies of the left upper extremity show a  low-amplitude median SNAP and otherwise unremarkable sensory motor  studies. Needle exam of the left upper extremity is unremarkable. IMPRESSION:  Essentially normal study with no evidence of cervical  radiculopathy, plexopathy or mononeuropathy. Correlate clinically.         Mabel Guo MD    D: 2021 11:50:48      T: 2021 11:54:10     [email or web address]  Job#: [id number]     Doc#: [id number]    CC:

## 2022-01-10 ENCOUNTER — OFFICE VISIT (OUTPATIENT)
Dept: OBSTETRICS AND GYNECOLOGY | Facility: CLINIC | Age: 37
End: 2022-01-10

## 2022-01-10 VITALS
DIASTOLIC BLOOD PRESSURE: 86 MMHG | BODY MASS INDEX: 28.88 KG/M2 | SYSTOLIC BLOOD PRESSURE: 132 MMHG | HEIGHT: 67 IN | WEIGHT: 184 LBS

## 2022-01-10 DIAGNOSIS — Z78.9 NON-SMOKER: ICD-10-CM

## 2022-01-10 DIAGNOSIS — Z30.2 ENCOUNTER FOR FEMALE STERILIZATION PROCEDURE: Primary | ICD-10-CM

## 2022-01-10 PROCEDURE — 99203 OFFICE O/P NEW LOW 30 MIN: CPT | Performed by: OBSTETRICS & GYNECOLOGY

## 2022-01-10 RX ORDER — SODIUM CHLORIDE 9 MG/ML
100 INJECTION, SOLUTION INTRAVENOUS CONTINUOUS
Status: CANCELLED | OUTPATIENT
Start: 2022-01-10

## 2022-01-10 RX ORDER — RIZATRIPTAN BENZOATE 10 MG/1
10 TABLET ORAL DAILY PRN
COMMUNITY

## 2022-01-10 RX ORDER — BUPROPION HYDROCHLORIDE 150 MG/1
1 TABLET ORAL EVERY MORNING
COMMUNITY
Start: 2021-12-08

## 2022-01-10 RX ORDER — IBUPROFEN 800 MG
TABLET ORAL
COMMUNITY

## 2022-01-10 RX ORDER — CHLORAL HYDRATE 500 MG
CAPSULE ORAL
COMMUNITY

## 2022-01-10 RX ORDER — DICLOFENAC SODIUM 75 MG/1
TABLET, DELAYED RELEASE ORAL
COMMUNITY
Start: 2021-10-14

## 2022-01-10 RX ORDER — MEDROXYPROGESTERONE ACETATE 150 MG/ML
INJECTION, SUSPENSION INTRAMUSCULAR
COMMUNITY
Start: 2021-10-14

## 2022-01-10 RX ORDER — CYCLOBENZAPRINE HCL 10 MG
10 TABLET ORAL
COMMUNITY

## 2022-01-10 RX ORDER — SERTRALINE HYDROCHLORIDE 100 MG/1
100 TABLET, FILM COATED ORAL
COMMUNITY
Start: 2021-12-08

## 2022-01-10 RX ORDER — DROSPIRENONE AND ETHINYL ESTRADIOL 0.02-3(28)
1 KIT ORAL
COMMUNITY
Start: 2021-12-08

## 2022-01-10 NOTE — H&P
Nabil Stephens MD  [facility]  [address]  [place] [address]  Office [phone]  Fax [phone]      [facility]  Lucia Spaulding  1985  [id number]  [id number]  1/10/2022    Subjective   Lucia Spaulding is a 36 y.o. year old female  who presents for surgery due to Desire for Permanent Sterilization.  Failed conservative measures include N/A.    COEMIG Procedure no  Rating of Pain None  Effect on daily activities none    HPI    Risks, benefits, and alternatives of permanent sterilization were discussed with the patient in detail. Intraoperative risks of bleeding and damage to surrounding organs, including but not limited to intestine, bladder and ureter, were explained. Management of these were also explained. Postoperative complications such as infection, pneumonia, DVT, and bleeding were explained. The importance of compliance with postoperative restrictions was discussed. Laparoscopic versus hysteroscopic options were discussed. In regards to Essure, adequate contraception until hysterosalpingogram confirms tubal blockage was explained. Success and failure rates were discussed. Increased risk of ectopic pregnancy was explained. In addition, reversible forms of contraception were reviewed such as the pill, the patch, the shot, and the IUD.     Specifically, bilateral salpingectomy was discussed.  Reduction in fallopian tube cancer was discussed as well as potential decrease in ovarian cancer risk discussed.  Irreversible nature of this approach as well as the need for at least one additional laparoscopic incision was discussed.    Recent concerns regarding the Essure procedure were reviewed as well as the management of those issues, if they were to occur, were explained in detail.    All of the patient's questions were answered to her satisfaction. She was encouraged to return for an additional appointment if she had further questions. She verbalized  understanding of the above and wished to proceed with the outlined plan.    Patient desires bilateral salpingectomy    Past Medical History:   Diagnosis Date   • Migraine    • Rheumatoid arthritis (HCC)        History reviewed. No pertinent surgical history.      Current Outpatient Medications:   •  buPROPion XL (WELLBUTRIN XL) 150 MG 24 hr tablet, Take 1 tablet by mouth Every Morning., Disp: , Rfl:   •  Cholecalciferol (Vitamin D3) 10 MCG (400 UNIT) capsule, Take  by mouth., Disp: , Rfl:   •  cyclobenzaprine (FLEXERIL) 10 MG tablet, Take 10 mg by mouth., Disp: , Rfl:   •  diclofenac (VOLTAREN) 75 MG EC tablet, , Disp: , Rfl:   •  drospirenone-ethinyl estradiol (PANDA,GIANVI) 3-0.02 MG per tablet, Take 1 tablet by mouth., Disp: , Rfl:   •  Etanercept (Enbrel SureClick) 50 MG/ML solution auto-injector, , Disp: , Rfl:   •  methotrexate 2.5 MG tablet, Take  by mouth 4 (Four) Times a Week., Disp: , Rfl:   •  Omega-3 Fatty Acids (fish oil) 1000 MG capsule capsule, Take  by mouth., Disp: , Rfl:   •  rizatriptan (MAXALT) 10 MG tablet, Take 10 mg by mouth Daily As Needed., Disp: , Rfl:   •  sertraline (ZOLOFT) 100 MG tablet, Take 100 mg by mouth., Disp: , Rfl:     Allergies   Allergen Reactions   • Minocycline Swelling   • Adalimumab Rash       Family History   Problem Relation Age of Onset   • Heart attack Father    • Diabetes Father    • Hypertension Father    • No Known Problems Mother    • Breast cancer Maternal Aunt    • Breast cancer Paternal Aunt        Social History     Socioeconomic History   • Marital status:    Tobacco Use   • Smoking status: Never Smoker   • Smokeless tobacco: Never Used   Substance and Sexual Activity   • Alcohol use: Never   • Drug use: Never   • Sexual activity: Yes     Partners: Male     Birth control/protection: OCP       OB History    Para Term  AB Living   1 1 1 0 0 1   SAB IAB Ectopic Molar Multiple Live Births   0 0 0 0 0 1      # Outcome Date GA Lbr Clem/2nd Weight  Sex Delivery Anes PTL Lv   1 Term 06/08/07 40w0d  3572 g (7 lb 14 oz) F Vag-Spont EPI  ROLLY       Review of Systems   All other systems reviewed and are negative.         Objective   Vitals:    01/10/22 1034   BP: 132/86       Physical Exam  Vitals and nursing note reviewed. Exam conducted with a chaperone present.   Constitutional:       Appearance: Normal appearance.   HENT:      Head: Normocephalic and atraumatic.   Abdominal:      General: Abdomen is flat. Bowel sounds are normal.      Palpations: Abdomen is soft.   Musculoskeletal:         General: Normal range of motion.      Cervical back: Normal range of motion and neck supple.   Skin:     General: Skin is warm and dry.   Neurological:      General: No focal deficit present.      Mental Status: She is alert and oriented to person, place, and time. Mental status is at baseline.   Psychiatric:         Mood and Affect: Mood normal.         Behavior: Behavior normal.         Thought Content: Thought content normal.         Judgment: Judgment normal.            Assessment/Plan   Assessment/Plan:  Diagnoses and all orders for this visit:    1. Encounter for female sterilization procedure (Primary)  -     Case Request  -     COVID PRE-OP / PRE-PROCEDURE SCREENING ORDER (NO ISOLATION) - Swab, Nasopharynx; Future    2. Non-smoker    Other orders  -     Follow Anesthesia Guidelines / Standing Orders; Future  -     Obtain informed consent  -     Provide NPO Instructions to Patient; Future  -     Chlorhexidine Skin Prep; Future        The risks, benefits, and alternatives of the procedure; along with the risks of anesthesia was discussed in full with the patient and all questions were answered.    Nabil Stephens MD

## 2022-01-10 NOTE — PROGRESS NOTES
Chief Complaint  Gynecologic Exam (pt here to discuss sterilization, states Rolanda Hale does annual, last pap 10/30/2020 by Rolanda Hale and denies any abnormal hx)    Subjective          Lucia Spaulding presents to [facility]  Patient presents today to discuss permanent sterilization  Reversible forms of birth control as well as permanent sterilization reviewed  She desires bilateral salpingectomy    Risks, benefits, and alternatives of permanent sterilization were discussed with the patient in detail. Intraoperative risks of bleeding and damage to surrounding organs, including but not limited to intestine, bladder and ureter, were explained. Management of these were also explained. Postoperative complications such as infection, pneumonia, DVT, and bleeding were explained. The importance of compliance with postoperative restrictions was discussed. Laparoscopic versus hysteroscopic options were discussed. In regards to Essure, adequate contraception until hysterosalpingogram confirms tubal blockage was explained. Success and failure rates were discussed. Increased risk of ectopic pregnancy was explained. In addition, reversible forms of contraception were reviewed such as the pill, the patch, the shot, and the IUD.     Specifically, bilateral salpingectomy was discussed.  Reduction in fallopian tube cancer was discussed as well as potential decrease in ovarian cancer risk discussed.  Irreversible nature of this approach as well as the need for at least one additional laparoscopic incision was discussed.    Recent concerns regarding the Essure procedure were reviewed as well as the management of those issues, if they were to occur, were explained in detail.    All of the patient's questions were answered to her satisfaction. She was encouraged to return for an additional appointment if she had further questions. She verbalized understanding of the above and wished to proceed with the  "outlined plan.    She does have a history of menorrhagia and dysmenorrhea for which she takes continuous OCPs  We discussed the possible need to continue those postoperatively  She is agreeable to proceeding      Objective   Vital Signs:   /86 (BP Location: Left arm, Patient Position: Sitting, Cuff Size: Adult)   Ht 170.2 cm (67\")   Wt 83.5 kg (184 lb)   BMI 28.82 kg/m²     Physical Exam  Vitals and nursing note reviewed. Exam conducted with a chaperone present.   Constitutional:       Appearance: Normal appearance.   HENT:      Head: Normocephalic and atraumatic.   Abdominal:      General: Abdomen is flat. Bowel sounds are normal.      Palpations: Abdomen is soft.   Musculoskeletal:         General: Normal range of motion.      Cervical back: Normal range of motion and neck supple.   Skin:     General: Skin is warm and dry.   Neurological:      General: No focal deficit present.      Mental Status: She is alert and oriented to person, place, and time. Mental status is at baseline.   Psychiatric:         Mood and Affect: Mood normal.         Behavior: Behavior normal.         Thought Content: Thought content normal.         Judgment: Judgment normal.        Result Review :                 Assessment and Plan    Diagnoses and all orders for this visit:    1. Encounter for female sterilization procedure (Primary)  -     Case Request  -     COVID PRE-OP / PRE-PROCEDURE SCREENING ORDER (NO ISOLATION) - Swab, Nasopharynx; Future    2. Non-smoker    Other orders  -     Follow Anesthesia Guidelines / Standing Orders; Future  -     Obtain informed consent  -     Provide NPO Instructions to Patient; Future  -     Chlorhexidine Skin Prep; Future        Follow Up   No follow-ups on file.  Patient was given instructions and counseling regarding her condition or for health maintenance advice. Please see specific information pulled into the AVS if appropriate.   Plan laparoscopic bilateral salpingectomy for sterilization " purposes    Nabil Stephens MD

## 2022-01-13 ENCOUNTER — TELEPHONE (OUTPATIENT)
Dept: OBSTETRICS AND GYNECOLOGY | Facility: CLINIC | Age: 37
End: 2022-01-13

## 2022-01-13 NOTE — TELEPHONE ENCOUNTER
Called and informed pt that her sx time changed for 01/28/2022 and she needs to arrive at [facility] at 0700 and reminded pt not to eat or drink after midnight, pt voiced understanding.

## 2022-01-14 ENCOUNTER — TELEPHONE (OUTPATIENT)
Dept: OBSTETRICS AND GYNECOLOGY | Facility: CLINIC | Age: 37
End: 2022-01-14

## 2022-01-14 NOTE — TELEPHONE ENCOUNTER
Pt called office and left a VM stating she wants to cancel her surgery.  Pt states she does not want to reschedule at this time.

## 2022-02-28 ENCOUNTER — OFFICE VISIT (OUTPATIENT)
Dept: NEUROLOGY | Age: 37
End: 2022-02-28
Payer: OTHER GOVERNMENT

## 2022-02-28 VITALS
WEIGHT: 170 LBS | RESPIRATION RATE: 16 BRPM | SYSTOLIC BLOOD PRESSURE: 125 MMHG | DIASTOLIC BLOOD PRESSURE: 81 MMHG | HEIGHT: 67 IN | BODY MASS INDEX: 26.68 KG/M2 | HEART RATE: 82 BPM

## 2022-02-28 DIAGNOSIS — G43.019 INTRACTABLE MIGRAINE WITHOUT AURA AND WITHOUT STATUS MIGRAINOSUS: Primary | ICD-10-CM

## 2022-02-28 DIAGNOSIS — R41.3 MEMORY LOSS: ICD-10-CM

## 2022-02-28 DIAGNOSIS — G47.01 INSOMNIA DUE TO MEDICAL CONDITION: ICD-10-CM

## 2022-02-28 DIAGNOSIS — R53.82 CHRONIC FATIGUE: ICD-10-CM

## 2022-02-28 PROCEDURE — 99203 OFFICE O/P NEW LOW 30 MIN: CPT | Performed by: PSYCHIATRY & NEUROLOGY

## 2022-02-28 RX ORDER — NORTRIPTYLINE HYDROCHLORIDE 25 MG/1
CAPSULE ORAL
Qty: 60 CAPSULE | Refills: 5 | Status: SHIPPED | OUTPATIENT
Start: 2022-02-28 | End: 2022-04-05 | Stop reason: SDUPTHER

## 2022-02-28 RX ORDER — UBROGEPANT 100 MG/1
100 TABLET ORAL
Qty: 12 TABLET | Refills: 5 | Status: SHIPPED | OUTPATIENT
Start: 2022-02-28 | End: 2022-02-28

## 2022-02-28 NOTE — PROGRESS NOTES
[facility]  [address] [place] [address] Drive, [address]  [place], [place]  Phone [phone]  Fax [phone] [address] [facility] PATIENT VISIT        Patient: Deloris Sanchez  :  1985  Age:  39 y.o. MRN:  [id number]  Account #:  [de-identified]:  [id number]    Referring Provider: MARTÍNEZ Katz CNP  [address] [facility]  [facility]  Consulting Provider: Yohannes Orosco M.D.    [facility]:  Chief Complaint   Patient presents with    New Patient     Headaches       History Source: History obtained from the patient. PCP: MARTÍNEZ Katz CNP    HISTORY OF PRESENTILLNESS:   Deloris Sanchez is a 39y.o. year old woman with a history of rheumatoid arthritis who was referred for headaches. She has had migraines for years. They have fluctuated over the years. She has not been on a preventative medications. She has not found exacerbating or precipitating features. The headaches are often one sided and associated with nausea and photophobia. Imitrex and Maxalt helped but caused neck pain and malaise. Damion Shackle helped without side effects but her insurance stopped covering it. She does not sleep well at night, sleep maintenance issues. She believes that she gets 5 or 6 hours of sleep at night. She naps every day 1 to 3 hours. She additionally complains of fatigue, drowsiness, poor attention, poor concentration, memory loss. She does have RA. PAST MEDICAL HITORY:    Past Medical History:   Diagnosis Date    Anxiety     Depression     Headache     Rheumatoid arthritis ([facility] [address]        History reviewed. No pertinent surgical history. Recent Hospitalizations  · None    Significant Injuries  · None    Habits  Deloris Sanchez reports that she has never smoked. She has never used smokeless tobacco. She reports that she does not drink alcohol and does not use drugs.     Current Outpatient Medications   Medication Sig Dispense Refill    nortriptyline (PAMELOR) 25 MG capsule 1 to 2 PO q HS for sleep 60 capsule 5    Ubrogepant (UBRELVY) 100 MG TABS Take 100 mg by mouth once as needed (migraine) 12 tablet 5    sertraline (ZOLOFT) 100 MG tablet Take 1 tablet by mouth daily Tale 1.5 tablets DAILY 145 tablet 3    ENBREL SURECLICK 50 MG/ML SOAJ       diclofenac (VOLTAREN) 75 MG EC tablet       buPROPion (WELLBUTRIN XL) 150 MG extended release tablet TAKE 1 TABLET EVERY MORNING 90 tablet 3    drospirenone-ethinyl estradiol (DONTRELL) 3-0.02 MG per tablet Take 1 tablet by mouth daily Take the pill continue this skipping the placebo pills 84 tablet 3    Ubrogepant (UBRELVY) 50 MG TABS 1 p.o. at the onset of a headache and may repeat one in a 24-hour. 27 tablet 1    cyclobenzaprine (FLEXERIL) 10 MG tablet Take 10 mg by mouth daily as needed      folic acid (FOLVITE) 1 MG tablet       methotrexate (RHEUMATREX) 2.5 MG chemo tablet 4 PO Q Week       No current facility-administered medications for this visit. 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 REVIEW OF SYSTEMS:  Review of Systems   Constitutional: Positive for fatigue. Negative for chills and fever. HENT: Negative for hearing loss and tinnitus. Eyes: Positive for photophobia. Negative for visual disturbance. Respiratory: Negative for cough and shortness of breath. Cardiovascular: Negative for chest pain and palpitations. Gastrointestinal: Positive for nausea. Negative for vomiting.    Endocrine: Negative for polydipsia and polyuria. Genitourinary: Negative for frequency and urgency. Musculoskeletal: Positive for arthralgias. Negative for back pain. Skin: Negative for rash and wound. Allergic/Immunologic: Negative for environmental allergies and food allergies. Neurological: Positive for headaches. Negative for dizziness, tremors, seizures, syncope, facial asymmetry, speech difficulty, weakness, light-headedness and numbness. Hematological: Negative for adenopathy. Does not bruise/bleed easily. Psychiatric/Behavioral: Negative for dysphoric mood. The patient is not hyperactive. PHYSICAL EXAMINATION:  Vitals:  /81   Pulse 82   Resp 16   Ht 5' 7\" (1.702 m)   Wt 170 lb (77.1 kg)   BMI 26.63 kg/m²   General appearance:  Alert, well developed, well nourished, in no distress  HEENT:  normocephalic, atraumatic, sclera appear normal, no nasal abnormalities, no rhinorrhea, Ears appear normal, oral mucous membranes are moist without erythema, trachea midline, thyroid is normal, no lymphadenopathy or neck mass. Cardiovascular:  Regular rate and rhythm without murmer. No peripheral edema, No cyanosis or clubbing. No carotid bruits. Pulmonary:  Lungs are clear to auscultation. Breathing appears normal, good expansion, normal effort without use of accessory muscles  Musculoskeletal:  Joints are normal.  No splints, slings, or casts. Spine appears normal with normal posture and range of motion. Integument:  No rash, erythema, or pallor  Psychiatric:  Mood, affect, and behavior appear normal      NEUROLOGIC EXAMINATION:  Neurologic Exam     Mental Status   Oriented to person, place, and time. Speech: speech is normal   Level of consciousness: alert  Speech is fluent. Cranial Nerves     CN II   Visual fields full to confrontation. CN III, IV, VI   Extraocular motions are normal.     CN VII   Facial expression full, symmetric.      CN VIII   Hearing: intact    CN IX, X   Palate: symmetric    CN XI   Right sternocleidomastoid strength: normal  Left sternocleidomastoid strength: normal  Right trapezius strength: normal  Left trapezius strength: normal    CN XII   Tongue: not atrophic  Fasciculations: absent  Tongue deviation: none    Motor Exam   Muscle bulk: normal  Overall muscle tone: normal  Right arm pronator drift: absent  Left arm pronator drift: absent    Strength   Right neck flexion: 5/5  Left neck flexion: 5/5  Right neck extension: 5/5  Left neck extension: 5/5  Right deltoid: 5/5  Left deltoid: 5/5  Right biceps: 5/5  Left biceps: 5/5  Right triceps: 5/5  Left triceps: 5/5  Right wrist flexion: 5/5  Left wrist flexion: 5/5  Right wrist extension: 5/5  Left wrist extension: 5/5  Right interossei: 5/5  Left interossei: 5/5  Right iliopsoas: 5/5  Left iliopsoas: 5/5  Right quadriceps: 5/5  Left quadriceps: 5/5  Right glutei: 5/5  Left glutei: 5/5  Right anterior tibial: 5/5  Left anterior tibial: 5/5  Right posterior tibial: 5/5  Left posterior tibial: 5/5  Right peroneal: 5/5  Left peroneal: 5/5  Right gastroc: 5/5  Left gastroc: 5/5    Sensory Exam   Light touch normal.   Vibration normal.     Gait, Coordination, and Reflexes     Gait  Gait: normal    Coordination   Romberg: negative  Finger to nose coordination: normal  Tandem walking coordination: normal    Tremor   Resting tremor: absent  Intention tremor: absent  Action tremor: absent    Reflexes   Right brachioradialis: 2+  Left brachioradialis: 2+  Right biceps: 2+  Left biceps: 2+  Right triceps: 2+  Left triceps: 2+  Right patellar: 2+  Left patellar: 2+  Right achilles: 2+  Left achilles: 2+  Right plantar: normal  Left plantar: normal  Right Ayon: absent  Left Ayon: absent  Right ankle clonus: absent  Left ankle clonus: absent  Rapid alternating movements were normal.         IMPRESSION:   Diagnosis Orders   1. Intractable migraine without aura and without status migrainosus  MRI BRAIN W WO CONTRAST   2.  Insomnia due to medical condition     3. Chronic fatigue     4. Memory loss  MRI BRAIN W WO CONTRAST   She has RA but has symptoms suggestive of fibromyalgia. PLAN:  1. Orders Placed This Encounter   Procedures    MRI BRAIN W WO CONTRAST     Standing Status:   Future     Standing Expiration Date:   2/28/2023     Order Specific Question:   STAT Creatinine as needed:     Answer:   Yes     Order Specific Question:   Reason for exam:     Answer:   HA, memory loss. .     1. Will have you get an MRI head  2. Start taking nortriptyline 25 mg at bedtime. This is for sleep and HA reduction. 3. Ubrelvy 100 mg as needed to get rid of migraines  4.  Consider a sleep study    Puneet Del Rosario M.D.

## 2022-02-28 NOTE — PATIENT INSTRUCTIONS
INSTRUCTIONS:  1. Will have you get an MRI head  2. Start taking nortriptyline 25 mg at bedtime. This is for sleep and HA reduction.   3. Ubrelvy 100 mg as needed to get rid of migraines

## 2022-03-06 ASSESSMENT — ENCOUNTER SYMPTOMS
VOMITING: 0
COUGH: 0
BACK PAIN: 0
PHOTOPHOBIA: 1
NAUSEA: 1
SHORTNESS OF BREATH: 0

## 2022-03-08 ENCOUNTER — HOSPITAL ENCOUNTER (OUTPATIENT)
Dept: MRI IMAGING | Age: 37
Discharge: HOME | End: 2022-03-08
Payer: OTHER GOVERNMENT

## 2022-03-08 DIAGNOSIS — G43.019 INTRACTABLE MIGRAINE WITHOUT AURA AND WITHOUT STATUS MIGRAINOSUS: ICD-10-CM

## 2022-03-08 DIAGNOSIS — R41.3 MEMORY LOSS: ICD-10-CM

## 2022-03-08 PROCEDURE — 70553 MRI BRAIN STEM W/O & W/DYE: CPT

## 2022-03-08 PROCEDURE — 6360000004 HC RX CONTRAST MEDICATION: Performed by: PSYCHIATRY & NEUROLOGY

## 2022-03-08 PROCEDURE — A9577 INJ MULTIHANCE: HCPCS | Performed by: PSYCHIATRY & NEUROLOGY

## 2022-03-08 RX ADMIN — GADOBENATE DIMEGLUMINE 15 ML: 529 INJECTION, SOLUTION INTRAVENOUS at 14:13

## 2022-03-09 ENCOUNTER — TELEPHONE (OUTPATIENT)
Dept: NEUROLOGY | Age: 37
End: 2022-03-09

## 2022-04-05 RX ORDER — NORTRIPTYLINE HYDROCHLORIDE 25 MG/1
CAPSULE ORAL
Qty: 180 CAPSULE | Refills: 1 | Status: SHIPPED | OUTPATIENT
Start: 2022-04-05

## 2022-05-09 ENCOUNTER — OFFICE VISIT (OUTPATIENT)
Dept: NEUROLOGY | Age: 37
End: 2022-05-09
Payer: OTHER GOVERNMENT

## 2022-05-09 VITALS
HEART RATE: 88 BPM | RESPIRATION RATE: 16 BRPM | BODY MASS INDEX: 24.33 KG/M2 | DIASTOLIC BLOOD PRESSURE: 75 MMHG | SYSTOLIC BLOOD PRESSURE: 118 MMHG | HEIGHT: 67 IN | WEIGHT: 155 LBS

## 2022-05-09 DIAGNOSIS — G47.10 HYPERSOMNOLENCE: ICD-10-CM

## 2022-05-09 DIAGNOSIS — G43.019 INTRACTABLE MIGRAINE WITHOUT AURA AND WITHOUT STATUS MIGRAINOSUS: Primary | ICD-10-CM

## 2022-05-09 DIAGNOSIS — R53.82 CHRONIC FATIGUE: ICD-10-CM

## 2022-05-09 DIAGNOSIS — G47.01 INSOMNIA DUE TO MEDICAL CONDITION: ICD-10-CM

## 2022-05-09 DIAGNOSIS — R41.3 MEMORY LOSS: ICD-10-CM

## 2022-05-09 PROCEDURE — 99214 OFFICE O/P EST MOD 30 MIN: CPT | Performed by: PSYCHIATRY & NEUROLOGY

## 2022-05-09 NOTE — PROGRESS NOTES
[facility] Neu[facility]  [address] [place] [address] Drive, [address]  [facility], [place]  Phone [phone]  Fax [phone]     [facility] Neurology Follow Up Encounter  22 11:22 AM CDT    Information:   Patient Name: Shayla Schlatter  :   1985  Age:   39 y.o. MRN:   [id number]  Account #:  [de-identified]  Today:  22    Provider: Nica Meadows M.D. Chief Complaint:   Chief Complaint   Patient presents with    Follow-up    Migraine       Subjective:   Shayla Schlatter is a 39 y.o. woman with a history of RA and migraines who is following up. She was a new patient last visit. I had her get an MRI head that was normal.  She was started on nortriptyline at bedtime and Ubrelvy PRN. She had previously failed Maxalt and Imitrex. She is doing better. She is having migraines about every 2 weeks. Her insurance did not yet approve the [place] and [place]. She is getting 6 to 7 hours of sleep at night. She is still drowsy and fatigued in the daytime. She denies symptoms of sleep paralysis, hypnagogic hallucinations, and cataplexy. She has had intermittent numbness and tingling in her hands. She says she was tested for carpal tunnel and it was negative. Objective:     Past Medical History:  Past Medical History:   Diagnosis Date    Anxiety     Depression     Headache     Rheumatoid arthritis ([address]        History reviewed. No pertinent surgical history. Recent Hospitalizations  · None    Significant Injuries  · None    Habits  Shayla Schlatter reports that she has never smoked. She has never used smokeless tobacco. She reports that she does not drink alcohol and does not use drugs.     Family History   Problem Relation Age of Onset    Arthritis Mother     Heart Disease Father     Diabetes Father     High Blood Pressure Father     High Cholesterol Father     Cancer Maternal Grandmother         uterine    Breast Cancer Maternal Aunt        Social History  Shayla Schlatter is , lives in [place], [place], and works part time at a [employer] in [place]. Medications:  Current Outpatient Medications   Medication Sig Dispense Refill    nortriptyline (PAMELOR) 25 MG capsule 1 to 2 PO q HS for sleep 180 capsule 1    sertraline (ZOLOFT) 100 MG tablet Take 1 tablet by mouth daily Tale 1.5 tablets DAILY 145 tablet 3    ENBREL SURECLICK 50 MG/ML SOAJ       diclofenac (VOLTAREN) 75 MG EC tablet       buPROPion (WELLBUTRIN XL) 150 MG extended release tablet TAKE 1 TABLET EVERY MORNING 90 tablet 3    drospirenone-ethinyl estradiol (DONTRELL) 3-0.02 MG per tablet Take 1 tablet by mouth daily Take the pill continue this skipping the placebo pills 84 tablet 3    Ubrogepant (UBRELVY) 50 MG TABS 1 p.o. at the onset of a headache and may repeat one in a 24-hour. 27 tablet 1    cyclobenzaprine (FLEXERIL) 10 MG tablet Take 10 mg by mouth daily as needed      folic acid (FOLVITE) 1 MG tablet       methotrexate (RHEUMATREX) 2.5 MG chemo tablet 4 PO Q Week       No current facility-administered medications for this visit. Allergies:   Allergies as of 05/09/2022 - Fully Reviewed 05/09/2022   Allergen Reaction Noted    Minocycline Swelling 03/12/2020    Humira [adalimumab] Rash 12/08/2021       Review of Systems:  Constitutional: negative for - chills and fever  Eyes:  negative for - visual disturbance and photophobia  HENMT: positive for - headaches and sinus pain  Respiratory: negative for - cough, hemoptysis, and shortness of breath  Cardiovascular: negative for - chest pain and palpitations  Gastrointestinal: negative for - blood in stools, constipation, diarrhea, nausea, and vomiting  Genito-Urinary: negative for - hematuria, urinary frequency, urinary urgency, and urinary retention  Musculoskeletal: positive for - joint pain, joint stiffness, and joint swelling  Hematological and Lymphatic: negative for - bleeding problems, abnormal bruising, and swollen lymph nodes  Endocrine:  negative for - polydipsia and polyphagia  Allergy/Immunology:  negative for - rhinorrhea, sinus congestion, hives  Integument:  negative for - negative for - rash, change in moles, new or changing lesions  Psychological: negative for - anxiety and depression  Neurological: negative for - memory loss, weakness  Positive for - numbness/tingling    PHYSICAL EXAMINATION:  Vitals:  /75   Pulse 88   Resp 16   Ht 5' 7\" (1.702 m)   Wt 155 lb (70.3 kg)   BMI 24.28 kg/m²   General appearance:  Alert, well developed, well nourished, in no distress  HEENT:  normocephalic, atraumatic, sclera appear normal, no nasal abnormalities, no rhinorrhea, Ears appear normal, oral mucous membranes are moist without erythema, trachea midline, thyroid is normal, no lymphadenopathy or neck mass. Cardiovascular:  Regular rate and rhythm without murmer. No peripheral edema, No cyanosis or clubbing. No carotid bruits. Pulmonary:  Lungs are clear to auscultation. Breathing appears normal, good expansion, normal effort without use of accessory muscles  Musculoskeletal:  Joints are normal.  No splints, slings, or casts. Spine appears normal with normal posture and range of motion. Integument:  No rash, erythema, or pallor  Psychiatric:  Mood, affect, and behavior appear normal      NEUROLOGIC EXAMINATION:  Mental Status:  alert, oriented to person, place, and time. Speech:  Clear without dysarthria or dysphonia  Language:  Fluent without aphasia  Cranial Nerves:   II Visual fields are full to confrontation   III,IV, VI Extraocular movements are full   VII Facial movements are symmetrical without weakness   VIII Hearing is intact   IX,X Shoulder shrug and head rotation strength are intact   XII No tongue atrophy or fasciculations. Normal tongue protrusion. No tongue weakness  Motor:  Normal strength in both upper and lower extremities. Normal muscle tone and bulk. Deep tendon reflexes are intact and symmetrical in both upper and lower extremities. Ayon's signs are absent bilaterally. There is no ankle clonus on either side. Sensation:  Sensation is intact to light touch, temperature, and vibration in all extremities. Coordination:  Rapid alternating movements are normal in both upper and lower extremities. Finger to nose testing is unimpaired bilaterally. Gait:  Normal station and gait. Pertinent Diagnostic Studies:  Narrative   EXAM: MR BRAIN WITHOUT AND WITH IV CONTRAST on 3/8/2022 2:07 PM   COMPARISON: None    HISTORY: 39years-old Female. G43.019   TECHNIQUE:    Routine pulse sequences were obtained of the brain before and after   the administration of IV contrast.    REPORT:    The ventricles and cerebrospinal fluid spaces are normal in size and   configuration for the patient's age. There is no evidence of   mass-effect or midline shift. No reduced diffusivity is demonstrated. No abnormally enhancing lesions are identified. The brainstem, sella,   pituitary, cerebellum are unremarkable. The basal cisterns are   preserved. No acute osseous lesion. The intraorbital structures are unremarkable. The flow voids are preserved. Dural sinuses are patent.     The visualized portions of the paranasal sinuses and mastoid air   cells are unremarkable.        Impression   1.  No acute intracranial abnormalities. 2.  No specific finding to explain the patient's migraines.    Signed by Dr Zuniga Evens Sleepiness Scale    0 = would never doze  1 = slight chance of dozing  2 = moderate chance of dozing  3 = high chance of dozing    Situation Chance of Dozing (0-3)    Sitting and reading       3    Watching TV        1    Sitting, inactive in a public place (e.g. a theatre or a meeting) 0    As a passenger in a car for an hour without a break   3    Lying down to rest in the afternoon when circumstances permit 3    Sitting and talking to someone     0    Sitting quietly after a lunch without alcohol    3    In a car, while stopped for a few minutes in the traffic  0    Total         13        Assessment:       ICD-10-CM    1. Intractable migraine without aura and without status migrainosus  G43.019    2. Insomnia due to medical condition  G47.01    3. Chronic fatigue  R53.82    4. Memory loss  R41.3        Plan:   1. Continue the nortriptyline  2. Will try and get the Ubrlvy precerted  3. Will arrange a polysomnogram followed by a multiple sleep latency test to assess the cause of the drowsiness  4.  FU in 3 months    Electronically signed by Artur Crabtree MD on 5/9/22

## 2022-05-09 NOTE — PATIENT INSTRUCTIONS
INSTRUCTIONS:  1. Continue the nortriptyline  2. Will try and get the Ubrlvy precerted  3.  Will arrange a polysomnogram followed by a multiple sleep latency test to assess the cause of the drowsiness

## 2022-12-08 RX ORDER — DROSPIRENONE AND ETHINYL ESTRADIOL 0.02-3(28)
1 KIT ORAL DAILY
Qty: 84 TABLET | Refills: 3 | Status: SHIPPED | OUTPATIENT
Start: 2022-12-08

## 2022-12-25 ENCOUNTER — PATIENT MESSAGE (OUTPATIENT)
Dept: PRIMARY CARE CLINIC | Age: 37
End: 2022-12-25

## 2022-12-27 RX ORDER — FLUCONAZOLE 150 MG/1
TABLET ORAL
Qty: 2 TABLET | Refills: 0 | Status: SHIPPED | OUTPATIENT
Start: 2022-12-27

## 2022-12-27 NOTE — TELEPHONE ENCOUNTER
From: Ann Rodriguez  To: Amaury Eaton  Sent: 12/25/2022 9:44 PM CST  Subject: Yeast Infrection     About 3 weeks ago I went to urgent care with a bad case of bronchitis. They gave me a week of antibiotics and I of course got a yeast infection. I've tried the over the counter meds and they aren't working. Is there anything you can prescribe me? If so, can you send it to the [facility] in [facility]? Thank you so much!      Ann Rodriguez   [phone]

## 2023-11-17 ENCOUNTER — OFFICE VISIT (OUTPATIENT)
Dept: PRIMARY CARE CLINIC | Age: 38
End: 2023-11-17
Payer: OTHER GOVERNMENT

## 2023-11-17 VITALS
HEIGHT: 67 IN | OXYGEN SATURATION: 98 % | TEMPERATURE: 97.7 F | SYSTOLIC BLOOD PRESSURE: 100 MMHG | RESPIRATION RATE: 16 BRPM | BODY MASS INDEX: 21.82 KG/M2 | DIASTOLIC BLOOD PRESSURE: 60 MMHG | WEIGHT: 139 LBS | HEART RATE: 86 BPM

## 2023-11-17 DIAGNOSIS — Z00.00 WELL ADULT EXAM: Primary | ICD-10-CM

## 2023-11-17 DIAGNOSIS — Z12.31 ENCOUNTER FOR SCREENING MAMMOGRAM FOR MALIGNANT NEOPLASM OF BREAST: ICD-10-CM

## 2023-11-17 DIAGNOSIS — Z12.4 SCREENING FOR MALIGNANT NEOPLASM OF CERVIX: ICD-10-CM

## 2023-11-17 DIAGNOSIS — Z13.220 ENCOUNTER FOR SCREENING FOR LIPID DISORDER: ICD-10-CM

## 2023-11-17 DIAGNOSIS — Z13.1 SCREENING FOR DIABETES MELLITUS: ICD-10-CM

## 2023-11-17 PROBLEM — J01.90 SINUSITIS, ACUTE: Status: ACTIVE | Noted: 2022-11-29

## 2023-11-17 PROBLEM — R05.9 COUGH, UNSPECIFIED: Status: ACTIVE | Noted: 2022-12-01

## 2023-11-17 PROBLEM — U07.1 COVID-19: Status: ACTIVE | Noted: 2023-11-17

## 2023-11-17 PROBLEM — B34.9 VIRAL INFECTION, UNSPECIFIED: Status: ACTIVE | Noted: 2023-11-17

## 2023-11-17 PROBLEM — Z20.822 CONTACT WITH AND (SUSPECTED) EXPOSURE TO COVID-19: Status: ACTIVE | Noted: 2022-12-01

## 2023-11-17 PROCEDURE — 99395 PREV VISIT EST AGE 18-39: CPT | Performed by: NURSE PRACTITIONER

## 2023-11-17 RX ORDER — DROSPIRENONE AND ETHINYL ESTRADIOL 0.02-3(28)
1 KIT ORAL DAILY
Qty: 30 TABLET | Refills: 0 | Status: SHIPPED | OUTPATIENT
Start: 2023-11-17

## 2023-11-17 RX ORDER — BUPROPION HYDROCHLORIDE 150 MG/1
TABLET ORAL
Qty: 90 TABLET | Refills: 3 | Status: SHIPPED | OUTPATIENT
Start: 2023-11-17

## 2023-11-17 RX ORDER — DROSPIRENONE AND ETHINYL ESTRADIOL 0.02-3(28)
KIT ORAL
Qty: 84 TABLET | OUTPATIENT
Start: 2023-11-17

## 2023-11-17 RX ORDER — DROSPIRENONE AND ETHINYL ESTRADIOL 0.02-3(28)
1 KIT ORAL DAILY
Qty: 84 TABLET | Refills: 3 | Status: SHIPPED | OUTPATIENT
Start: 2023-11-17

## 2023-11-17 SDOH — ECONOMIC STABILITY: FOOD INSECURITY: WITHIN THE PAST 12 MONTHS, YOU WORRIED THAT YOUR FOOD WOULD RUN OUT BEFORE YOU GOT MONEY TO BUY MORE.: NEVER TRUE

## 2023-11-17 SDOH — ECONOMIC STABILITY: FOOD INSECURITY: WITHIN THE PAST 12 MONTHS, THE FOOD YOU BOUGHT JUST DIDN'T LAST AND YOU DIDN'T HAVE MONEY TO GET MORE.: NEVER TRUE

## 2023-11-17 SDOH — ECONOMIC STABILITY: HOUSING INSECURITY
IN THE LAST 12 MONTHS, WAS THERE A TIME WHEN YOU DID NOT HAVE A STEADY PLACE TO SLEEP OR SLEPT IN A SHELTER (INCLUDING NOW)?: NO

## 2023-11-17 SDOH — ECONOMIC STABILITY: INCOME INSECURITY: HOW HARD IS IT FOR YOU TO PAY FOR THE VERY BASICS LIKE FOOD, HOUSING, MEDICAL CARE, AND HEATING?: NOT HARD AT ALL

## 2023-11-17 ASSESSMENT — PATIENT HEALTH QUESTIONNAIRE - PHQ9
5. POOR APPETITE OR OVEREATING: 0
2. FEELING DOWN, DEPRESSED OR HOPELESS: 1
1. LITTLE INTEREST OR PLEASURE IN DOING THINGS: 0
9. THOUGHTS THAT YOU WOULD BE BETTER OFF DEAD, OR OF HURTING YOURSELF: 0
4. FEELING TIRED OR HAVING LITTLE ENERGY: 1
6. FEELING BAD ABOUT YOURSELF - OR THAT YOU ARE A FAILURE OR HAVE LET YOURSELF OR YOUR FAMILY DOWN: 0
SUM OF ALL RESPONSES TO PHQ QUESTIONS 1-9: 5
8. MOVING OR SPEAKING SO SLOWLY THAT OTHER PEOPLE COULD HAVE NOTICED. OR THE OPPOSITE, BEING SO FIGETY OR RESTLESS THAT YOU HAVE BEEN MOVING AROUND A LOT MORE THAN USUAL: 0
SUM OF ALL RESPONSES TO PHQ QUESTIONS 1-9: 5
SUM OF ALL RESPONSES TO PHQ QUESTIONS 1-9: 5
7. TROUBLE CONCENTRATING ON THINGS, SUCH AS READING THE NEWSPAPER OR WATCHING TELEVISION: 0
10. IF YOU CHECKED OFF ANY PROBLEMS, HOW DIFFICULT HAVE THESE PROBLEMS MADE IT FOR YOU TO DO YOUR WORK, TAKE CARE OF THINGS AT HOME, OR GET ALONG WITH OTHER PEOPLE: 1
SUM OF ALL RESPONSES TO PHQ9 QUESTIONS 1 & 2: 1
SUM OF ALL RESPONSES TO PHQ QUESTIONS 1-9: 5
3. TROUBLE FALLING OR STAYING ASLEEP: 3

## 2023-11-17 ASSESSMENT — ENCOUNTER SYMPTOMS
GASTROINTESTINAL NEGATIVE: 1
EYES NEGATIVE: 1
RESPIRATORY NEGATIVE: 1

## 2023-11-17 ASSESSMENT — COLUMBIA-SUICIDE SEVERITY RATING SCALE - C-SSRS
4. HAVE YOU HAD THESE THOUGHTS AND HAD SOME INTENTION OF ACTING ON THEM?: NO
3. HAVE YOU BEEN THINKING ABOUT HOW YOU MIGHT KILL YOURSELF?: NO
5. HAVE YOU STARTED TO WORK OUT OR WORKED OUT THE DETAILS OF HOW TO KILL YOURSELF? DO YOU INTEND TO CARRY OUT THIS PLAN?: NO
7. DID THIS OCCUR IN THE LAST THREE MONTHS: NO

## 2023-11-17 NOTE — PATIENT INSTRUCTIONS
Continue the birth control continues to help with the heavy menses  Continue the Wellbutrin  Continue with Dr. Villalta Knee  Have your screening mammogram  The next time you get blood work with Dr. Keshawn Parks or when you go to get your mammogram and if you are fasting at least 6 hours you can get your fasting blood work your cholesterol and CMP. I would recommend you have these because of your father's history.

## 2023-11-22 LAB
AGE GDLN ACOG TESTING: NORMAL
CLINICAL REPORT: NORMAL
CYTOLOGY REVIEW, GYN: NORMAL
DIAGNOSIS ICD CODE: NORMAL
HB: CYTOTECHNOLT: NORMAL
HPV 16+18+31+33+35+39+45+51+52+56+58+59+68 DNA,CERVIX,PROBE: NEGATIVE
HPV GENOTYPE REFLEX: NORMAL
Lab: NORMAL
MICROSCOPIC OBSERVATION: NORMAL
SPECIMEN ADEQUACY:: NORMAL

## 2023-12-12 RX ORDER — DROSPIRENONE AND ETHINYL ESTRADIOL 0.02-3(28)
KIT ORAL
Qty: 28 TABLET | Refills: 2 | Status: SHIPPED | OUTPATIENT
Start: 2023-12-12

## 2024-02-23 RX ORDER — DROSPIRENONE AND ETHINYL ESTRADIOL 0.02-3(28)
1 KIT ORAL DAILY
Qty: 84 TABLET | Refills: 3 | Status: SHIPPED | OUTPATIENT
Start: 2024-02-23

## 2024-10-21 RX ORDER — DROSPIRENONE AND ETHINYL ESTRADIOL 0.02-3(28)
KIT ORAL
Qty: 28 TABLET | Refills: 2 | Status: SHIPPED | OUTPATIENT
Start: 2024-10-21

## 2025-01-18 ASSESSMENT — PATIENT HEALTH QUESTIONNAIRE - PHQ9
1. LITTLE INTEREST OR PLEASURE IN DOING THINGS: SEVERAL DAYS
1. LITTLE INTEREST OR PLEASURE IN DOING THINGS: SEVERAL DAYS
SUM OF ALL RESPONSES TO PHQ QUESTIONS 1-9: 2
SUM OF ALL RESPONSES TO PHQ9 QUESTIONS 1 & 2: 2
2. FEELING DOWN, DEPRESSED OR HOPELESS: SEVERAL DAYS
SUM OF ALL RESPONSES TO PHQ QUESTIONS 1-9: 2
SUM OF ALL RESPONSES TO PHQ9 QUESTIONS 1 & 2: 2
2. FEELING DOWN, DEPRESSED OR HOPELESS: SEVERAL DAYS

## 2025-01-21 ENCOUNTER — OFFICE VISIT (OUTPATIENT)
Dept: PRIMARY CARE CLINIC | Age: 40
End: 2025-01-21
Payer: OTHER GOVERNMENT

## 2025-01-21 VITALS
HEIGHT: 67 IN | TEMPERATURE: 98.5 F | WEIGHT: 145 LBS | RESPIRATION RATE: 16 BRPM | SYSTOLIC BLOOD PRESSURE: 110 MMHG | BODY MASS INDEX: 22.76 KG/M2 | OXYGEN SATURATION: 97 % | HEART RATE: 90 BPM | DIASTOLIC BLOOD PRESSURE: 70 MMHG

## 2025-01-21 DIAGNOSIS — Z00.00 WELL ADULT EXAM: Primary | ICD-10-CM

## 2025-01-21 DIAGNOSIS — M06.00 RHEUMATOID ARTHRITIS WITH NEGATIVE RHEUMATOID FACTOR, INVOLVING UNSPECIFIED SITE (HCC): ICD-10-CM

## 2025-01-21 DIAGNOSIS — F33.42 RECURRENT MAJOR DEPRESSIVE DISORDER, IN FULL REMISSION (HCC): ICD-10-CM

## 2025-01-21 PROBLEM — J01.90 SINUSITIS, ACUTE: Status: RESOLVED | Noted: 2022-11-29 | Resolved: 2025-01-21

## 2025-01-21 PROBLEM — Z20.822 CONTACT WITH AND (SUSPECTED) EXPOSURE TO COVID-19: Status: RESOLVED | Noted: 2022-12-01 | Resolved: 2025-01-21

## 2025-01-21 PROBLEM — U07.1 COVID-19: Status: RESOLVED | Noted: 2023-11-17 | Resolved: 2025-01-21

## 2025-01-21 PROBLEM — R05.9 COUGH, UNSPECIFIED: Status: RESOLVED | Noted: 2022-12-01 | Resolved: 2025-01-21

## 2025-01-21 PROBLEM — B34.9 VIRAL INFECTION, UNSPECIFIED: Status: RESOLVED | Noted: 2023-11-17 | Resolved: 2025-01-21

## 2025-01-21 PROCEDURE — 99395 PREV VISIT EST AGE 18-39: CPT | Performed by: NURSE PRACTITIONER

## 2025-01-21 RX ORDER — DROSPIRENONE AND ETHINYL ESTRADIOL 0.02-3(28)
1 KIT ORAL DAILY
Qty: 84 TABLET | Refills: 3 | Status: SHIPPED | OUTPATIENT
Start: 2025-01-21

## 2025-01-21 RX ORDER — BUPROPION HYDROCHLORIDE 150 MG/1
TABLET ORAL
Qty: 90 TABLET | Refills: 3 | Status: SHIPPED | OUTPATIENT
Start: 2025-01-21

## 2025-01-21 SDOH — ECONOMIC STABILITY: FOOD INSECURITY: WITHIN THE PAST 12 MONTHS, THE FOOD YOU BOUGHT JUST DIDN'T LAST AND YOU DIDN'T HAVE MONEY TO GET MORE.: NEVER TRUE

## 2025-01-21 SDOH — ECONOMIC STABILITY: FOOD INSECURITY: WITHIN THE PAST 12 MONTHS, YOU WORRIED THAT YOUR FOOD WOULD RUN OUT BEFORE YOU GOT MONEY TO BUY MORE.: NEVER TRUE

## 2025-01-21 ASSESSMENT — ENCOUNTER SYMPTOMS
EYES NEGATIVE: 1
RESPIRATORY NEGATIVE: 1
GASTROINTESTINAL NEGATIVE: 1

## 2025-01-21 NOTE — PROGRESS NOTES
[facility]  [address] [place] [address]  Dept: [phone]  Dept Fax: [phone]  Loc: [phone]    Ashleigh Luciano is a 39 y.o. female who presents today for her medical conditions/complaints as noted below.  Ashleigh Luciano is c/o of Annual Exam (Patient presents today for her annual physical and refill on meds.)        HPI:     HPI   Chief Complaint   Patient presents with    Annual Exam     Patient presents today for her annual physical and refill on meds.   She is still on the Wellbutrin and her birth control pills.  She has not had a tubal ligation.  They live in an area just outside of [place] till her  retires.  They are hoping to move  to this area.  She is not having any problems she is up-to-date on Pap smear and mammogram she is to have another mammogram when she turns 40.  She sees rheumatology for arthralgia  Past Medical History:   Diagnosis Date    Anxiety     Chronic back pain     Depression     Fibromyalgia     Headache     Restless legs syndrome     Rheumatoid arthritis (HCC)       History reviewed. No pertinent surgical history.        1/21/2025     2:11 PM 11/17/2023    11:31 AM 5/9/2022    11:19 AM 2/28/2022    10:57 AM 12/8/2021    11:29 AM 10/29/2020     1:45 PM   Vitals   SYSTOLIC 110 100 118 125 104 100   DIASTOLIC 70 60 75 81 66 70   Site     Left Upper Arm    Position     Sitting    Cuff Size     Large Adult    Pulse 90 86 88 82 85 85   Temp 98.5 °F (36.9 °C) 97.7 °F (36.5 °C)   99.3 °F (37.4 °C) 97.9 °F (36.6 °C)   Respirations 16 16 16 16 18 16   SpO2 97 % 98 %   97 % 99 %   Weight - Scale 145 lb 139 lb 155 lb 170 lb 181 lb 3.2 oz 169 lb   Height 5' 7\" 5' 7\" 5' 7\" 5' 7\" 5' 7\" 5' 7\"   Body Mass Index 22.71 kg/m2 21.77 kg/m2 24.27 kg/m2 26.62 kg/m2 28.38 kg/m2 26.47 kg/m2       Family History   Problem Relation Age of Onset    Arthritis Mother     Heart Disease Father     Diabetes Father     High Blood